# Patient Record
Sex: FEMALE | Race: BLACK OR AFRICAN AMERICAN | Employment: PART TIME | URBAN - METROPOLITAN AREA
[De-identification: names, ages, dates, MRNs, and addresses within clinical notes are randomized per-mention and may not be internally consistent; named-entity substitution may affect disease eponyms.]

---

## 2021-09-28 ENCOUNTER — TRANSCRIBE ORDER (OUTPATIENT)
Dept: SCHEDULING | Age: 61
End: 2021-09-28

## 2021-09-28 DIAGNOSIS — Z12.31 ENCOUNTER FOR SCREENING MAMMOGRAM FOR MALIGNANT NEOPLASM OF BREAST: Primary | ICD-10-CM

## 2021-09-28 DIAGNOSIS — Z80.3 FAMILY HISTORY OF BREAST CANCER IN SISTER: ICD-10-CM

## 2021-10-14 ENCOUNTER — HOSPITAL ENCOUNTER (OUTPATIENT)
Dept: MAMMOGRAPHY | Age: 61
Discharge: HOME | End: 2021-10-14
Attending: FAMILY MEDICINE
Payer: COMMERCIAL

## 2021-10-14 VITALS — WEIGHT: 199 LBS | BODY MASS INDEX: 35.26 KG/M2 | HEIGHT: 63 IN

## 2021-10-14 DIAGNOSIS — Z80.3 FAMILY HISTORY OF BREAST CANCER IN SISTER: ICD-10-CM

## 2021-10-14 DIAGNOSIS — Z12.31 ENCOUNTER FOR SCREENING MAMMOGRAM FOR MALIGNANT NEOPLASM OF BREAST: ICD-10-CM

## 2021-10-14 PROCEDURE — 77067 SCR MAMMO BI INCL CAD: CPT

## 2022-03-07 ENCOUNTER — HOSPITAL ENCOUNTER (INPATIENT)
Age: 62
LOS: 2 days | Discharge: HOME | DRG: 751 | End: 2022-03-09
Attending: EMERGENCY MEDICINE | Admitting: PSYCHIATRY & NEUROLOGY
Payer: COMMERCIAL

## 2022-03-07 DIAGNOSIS — F32.A DEPRESSION, UNSPECIFIED DEPRESSION TYPE: Primary | ICD-10-CM

## 2022-03-07 PROBLEM — F32.9 MDD (MAJOR DEPRESSIVE DISORDER): Status: ACTIVE | Noted: 2022-03-07

## 2022-03-07 LAB
ALBUMIN SERPL-MCNC: 4.1 G/DL (ref 3.5–5)
ALBUMIN/GLOB SERPL: 0.9 {RATIO} (ref 1.1–2.2)
ALP SERPL-CCNC: 80 U/L (ref 45–117)
ALT SERPL-CCNC: 38 U/L (ref 12–78)
AMPHET UR QL SCN: NEGATIVE
ANION GAP SERPL CALC-SCNC: 11 MMOL/L (ref 5–15)
APPEARANCE UR: CLEAR
AST SERPL-CCNC: 41 U/L (ref 15–37)
BACTERIA URNS QL MICRO: NEGATIVE /HPF
BARBITURATES UR QL SCN: NEGATIVE
BASOPHILS # BLD: 0 K/UL (ref 0–0.1)
BASOPHILS NFR BLD: 0 % (ref 0–1)
BENZODIAZ UR QL: NEGATIVE
BILIRUB SERPL-MCNC: 0.2 MG/DL (ref 0.2–1)
BILIRUB UR QL: NEGATIVE
BUN SERPL-MCNC: 13 MG/DL (ref 6–20)
BUN/CREAT SERPL: 16 (ref 12–20)
CALCIUM SERPL-MCNC: 9.2 MG/DL (ref 8.5–10.1)
CANNABINOIDS UR QL SCN: POSITIVE
CHLORIDE SERPL-SCNC: 101 MMOL/L (ref 97–108)
CO2 SERPL-SCNC: 27 MMOL/L (ref 21–32)
COCAINE UR QL SCN: NEGATIVE
COLOR UR: ABNORMAL
CREAT SERPL-MCNC: 0.8 MG/DL (ref 0.55–1.02)
DIFFERENTIAL METHOD BLD: ABNORMAL
DRUG SCRN COMMENT,DRGCM: ABNORMAL
EOSINOPHIL # BLD: 0.1 K/UL (ref 0–0.4)
EOSINOPHIL NFR BLD: 1 % (ref 0–7)
EPITH CASTS URNS QL MICRO: NORMAL /LPF
ERYTHROCYTE [DISTWIDTH] IN BLOOD BY AUTOMATED COUNT: 13.6 % (ref 11.5–14.5)
ETHANOL SERPL-MCNC: <10 MG/DL
FLUAV RNA SPEC QL NAA+PROBE: NOT DETECTED
FLUBV RNA SPEC QL NAA+PROBE: NOT DETECTED
GLOBULIN SER CALC-MCNC: 4.6 G/DL (ref 2–4)
GLUCOSE SERPL-MCNC: 93 MG/DL (ref 65–100)
GLUCOSE UR STRIP.AUTO-MCNC: NEGATIVE MG/DL
HCT VFR BLD AUTO: 36 % (ref 35–47)
HGB BLD-MCNC: 11.9 G/DL (ref 11.5–16)
HGB UR QL STRIP: ABNORMAL
IMM GRANULOCYTES # BLD AUTO: 0 K/UL (ref 0–0.04)
IMM GRANULOCYTES NFR BLD AUTO: 0 % (ref 0–0.5)
KETONES UR QL STRIP.AUTO: 15 MG/DL
LEUKOCYTE ESTERASE UR QL STRIP.AUTO: NEGATIVE
LIPASE SERPL-CCNC: 76 U/L (ref 73–393)
LYMPHOCYTES # BLD: 3.9 K/UL (ref 0.8–3.5)
LYMPHOCYTES NFR BLD: 36 % (ref 12–49)
MCH RBC QN AUTO: 31.6 PG (ref 26–34)
MCHC RBC AUTO-ENTMCNC: 33.1 G/DL (ref 30–36.5)
MCV RBC AUTO: 95.5 FL (ref 80–99)
METHADONE UR QL: NEGATIVE
MONOCYTES # BLD: 0.8 K/UL (ref 0–1)
MONOCYTES NFR BLD: 7 % (ref 5–13)
NEUTS SEG # BLD: 6.1 K/UL (ref 1.8–8)
NEUTS SEG NFR BLD: 56 % (ref 32–75)
NITRITE UR QL STRIP.AUTO: NEGATIVE
NRBC # BLD: 0 K/UL (ref 0–0.01)
NRBC BLD-RTO: 0 PER 100 WBC
OPIATES UR QL: NEGATIVE
PCP UR QL: NEGATIVE
PH UR STRIP: 6 [PH] (ref 5–8)
PLATELET # BLD AUTO: 284 K/UL (ref 150–400)
PLATELET COMMENTS,PCOM: ABNORMAL
PMV BLD AUTO: 9.8 FL (ref 8.9–12.9)
POTASSIUM SERPL-SCNC: 3.5 MMOL/L (ref 3.5–5.1)
PROT SERPL-MCNC: 8.7 G/DL (ref 6.4–8.2)
PROT UR STRIP-MCNC: NEGATIVE MG/DL
RBC # BLD AUTO: 3.77 M/UL (ref 3.8–5.2)
RBC #/AREA URNS HPF: NORMAL /HPF (ref 0–5)
RBC MORPH BLD: ABNORMAL
SARS-COV-2, COV2: NOT DETECTED
SODIUM SERPL-SCNC: 139 MMOL/L (ref 136–145)
SP GR UR REFRACTOMETRY: 1.02 (ref 1–1.03)
TSH SERPL DL<=0.05 MIU/L-ACNC: 1.85 UIU/ML (ref 0.36–3.74)
UROBILINOGEN UR QL STRIP.AUTO: 0.2 EU/DL (ref 0.2–1)
WBC # BLD AUTO: 10.9 K/UL (ref 3.6–11)
WBC URNS QL MICRO: NORMAL /HPF (ref 0–4)

## 2022-03-07 PROCEDURE — 83690 ASSAY OF LIPASE: CPT

## 2022-03-07 PROCEDURE — 80053 COMPREHEN METABOLIC PANEL: CPT

## 2022-03-07 PROCEDURE — 82077 ASSAY SPEC XCP UR&BREATH IA: CPT

## 2022-03-07 PROCEDURE — 80307 DRUG TEST PRSMV CHEM ANLYZR: CPT

## 2022-03-07 PROCEDURE — 84439 ASSAY OF FREE THYROXINE: CPT

## 2022-03-07 PROCEDURE — 36415 COLL VENOUS BLD VENIPUNCTURE: CPT

## 2022-03-07 PROCEDURE — 87636 SARSCOV2 & INF A&B AMP PRB: CPT

## 2022-03-07 PROCEDURE — 74011250637 HC RX REV CODE- 250/637: Performed by: PSYCHIATRY & NEUROLOGY

## 2022-03-07 PROCEDURE — 81001 URINALYSIS AUTO W/SCOPE: CPT

## 2022-03-07 PROCEDURE — 65220000003 HC RM SEMIPRIVATE PSYCH

## 2022-03-07 PROCEDURE — 85025 COMPLETE CBC W/AUTO DIFF WBC: CPT

## 2022-03-07 PROCEDURE — 74011250636 HC RX REV CODE- 250/636: Performed by: EMERGENCY MEDICINE

## 2022-03-07 PROCEDURE — 99285 EMERGENCY DEPT VISIT HI MDM: CPT

## 2022-03-07 PROCEDURE — 84443 ASSAY THYROID STIM HORMONE: CPT

## 2022-03-07 RX ORDER — HALOPERIDOL 5 MG/ML
5 INJECTION INTRAMUSCULAR
Status: DISCONTINUED | OUTPATIENT
Start: 2022-03-07 | End: 2022-03-08

## 2022-03-07 RX ORDER — LORAZEPAM 2 MG/ML
1 INJECTION INTRAMUSCULAR
Status: DISCONTINUED | OUTPATIENT
Start: 2022-03-07 | End: 2022-03-08

## 2022-03-07 RX ORDER — ASPIRIN 81 MG/1
81 TABLET ORAL DAILY
Status: DISCONTINUED | OUTPATIENT
Start: 2022-03-08 | End: 2022-03-09 | Stop reason: HOSPADM

## 2022-03-07 RX ORDER — ATORVASTATIN CALCIUM 40 MG/1
80 TABLET, FILM COATED ORAL
Status: DISCONTINUED | OUTPATIENT
Start: 2022-03-07 | End: 2022-03-09 | Stop reason: HOSPADM

## 2022-03-07 RX ORDER — SODIUM CHLORIDE 0.9 % (FLUSH) 0.9 %
5-10 SYRINGE (ML) INJECTION ONCE
Status: DISCONTINUED | OUTPATIENT
Start: 2022-03-07 | End: 2022-03-07

## 2022-03-07 RX ORDER — ASPIRIN 81 MG/1
TABLET ORAL
COMMUNITY

## 2022-03-07 RX ORDER — ROSUVASTATIN CALCIUM 20 MG/1
TABLET, COATED ORAL
COMMUNITY
Start: 2021-12-20

## 2022-03-07 RX ORDER — ADHESIVE BANDAGE
30 BANDAGE TOPICAL DAILY PRN
Status: DISCONTINUED | OUTPATIENT
Start: 2022-03-07 | End: 2022-03-09 | Stop reason: HOSPADM

## 2022-03-07 RX ORDER — OLANZAPINE 2.5 MG/1
5 TABLET ORAL
Status: DISCONTINUED | OUTPATIENT
Start: 2022-03-07 | End: 2022-03-08

## 2022-03-07 RX ORDER — TRAZODONE HYDROCHLORIDE 50 MG/1
50 TABLET ORAL
Status: DISCONTINUED | OUTPATIENT
Start: 2022-03-07 | End: 2022-03-09 | Stop reason: HOSPADM

## 2022-03-07 RX ORDER — ACETAMINOPHEN 325 MG/1
650 TABLET ORAL
Status: DISCONTINUED | OUTPATIENT
Start: 2022-03-07 | End: 2022-03-09 | Stop reason: HOSPADM

## 2022-03-07 RX ORDER — CHLORPHENIRAMINE MALEATE, PHENYLEPHRINE HCL 4; 10 MG/1; MG/1
TABLET, FILM COATED ORAL
COMMUNITY
End: 2022-03-09

## 2022-03-07 RX ORDER — HYDROXYZINE 25 MG/1
50 TABLET, FILM COATED ORAL
Status: DISCONTINUED | OUTPATIENT
Start: 2022-03-07 | End: 2022-03-09 | Stop reason: HOSPADM

## 2022-03-07 RX ORDER — BENZTROPINE MESYLATE 1 MG/1
1 TABLET ORAL
Status: DISCONTINUED | OUTPATIENT
Start: 2022-03-07 | End: 2022-03-08

## 2022-03-07 RX ORDER — DIPHENHYDRAMINE HYDROCHLORIDE 50 MG/ML
50 INJECTION, SOLUTION INTRAMUSCULAR; INTRAVENOUS
Status: DISCONTINUED | OUTPATIENT
Start: 2022-03-07 | End: 2022-03-08

## 2022-03-07 RX ADMIN — SODIUM CHLORIDE 1000 ML: 9 INJECTION, SOLUTION INTRAVENOUS at 17:14

## 2022-03-07 RX ADMIN — ATORVASTATIN CALCIUM 80 MG: 40 TABLET, FILM COATED ORAL at 21:19

## 2022-03-07 NOTE — ED TRIAGE NOTES
Pt arrived by POV with complaint of not feeling like herself. Pt reports yesterday she had thoughts of hurting herself without a plan. Pt asking if hot flashes and going through the change can make her feel like this, pt educated that hormonal imbalance can make women feel sad and like they are not in control.   Pt denies SI today, pt educated on ER flow
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
## 2022-03-07 NOTE — ED PROVIDER NOTES
EMERGENCY DEPARTMENT HISTORY AND PHYSICAL EXAM          Date: 3/7/2022  Patient Name: Hollie Napoles    History of Presenting Illness     Chief Complaint   Patient presents with   [address] Problem       History Provided By: Patient    HPI: Hollie Napoles is a 63 y.o. female, pmhx high cholesterol, who presents ambulatory from her primary care office to the ED c/o depression    Patient explains she has been doing okay and she even went to a party Saturday night but yesterday she just felt hopeless all day and last night she wanted to commit suicide. She states she does not have any plans and she does not feel like she wants to hurt her self today but she still feels hopeless. She states she has been eating and drinking and sleeping okay and is not had any headaches, vision changes, chest pain, abdominal pain or back pain. She notes that some mornings when she gets up she does feel palpitations and some days she feels dizzy when her sinuses are acting up, but she denies both at this time. At her primary care doctor's office she screened extremely high on the depression scale so they called the ER and transported the patient via local 's department. PCP: Ede Chinchilla MD    Allergies: Penicillin  Social Hx: -tobacco, -vaping, -EtOH, -Illicit Drugs; There are no other complaints, changes, or physical findings at this time. Current Facility-Administered Medications   Medication Dose Route Frequency Provider Last Rate Last Admin    sodium chloride (NS) flush 5-10 mL  5-10 mL IntraVENous ONCE TermeerCurt MD        sodium chloride 0.9 % bolus infusion 1,000 mL  1,000 mL IntraVENous ONCE TermeerCurt MD         Current Outpatient Medications   Medication Sig Dispense Refill    rosuvastatin (CRESTOR) 20 mg tablet rosuvastatin 20 mg tablet   Take 1 tablet every day by oral route.       aspirin delayed-release 81 mg tablet Adult Aspirin Regimen 81 mg tablet,delayed release   Take 1 tablet every day by oral route.  chlorpheniramine-phenylephrine (Sinus and Allergy PE) 4-10 mg tab Sinus and Allergy PE         Past History     Past Medical History:  Past Medical History:   Diagnosis Date    Menopause        Past Surgical History:  Past Surgical History:   Procedure Laterality Date    HX BREAST BIOPSY Left     BENIGN EARLY 2000s       Family History:  Family History   Problem Relation Age of Onset    Breast Cancer Sister        Social History:  Social History     Tobacco Use    Smoking status: Never Smoker    Smokeless tobacco: Never Used   Vaping Use    Vaping Use: Never used   Substance Use Topics    Alcohol use: Yes     Comment: every other day    Drug use: Not on file       Allergies: Allergies   Allergen Reactions    Pcn [Penicillins] Rash         Review of Systems   Review of Systems   Constitutional: Negative for activity change, appetite change, chills, fatigue, fever and unexpected weight change. HENT: Negative for congestion. Eyes: Negative for pain and visual disturbance. Respiratory: Negative for cough and shortness of breath. Cardiovascular: Positive for palpitations. Negative for chest pain. Gastrointestinal: Negative for abdominal pain, diarrhea, nausea and vomiting. Genitourinary: Negative for dysuria. Musculoskeletal: Negative for back pain. Skin: Negative for rash. Neurological: Positive for dizziness (At times but none recently). Negative for headaches. Psychiatric/Behavioral: Positive for dysphoric mood. Negative for agitation and behavioral problems. Physical Exam   Physical Exam  Vitals and nursing note reviewed. Constitutional:       Appearance: She is well-developed. She is not diaphoretic. Comments: This is an overweight middle-aged female with good eye contact, with normal vital signs, in mild acute distress   HENT:      Head: Normocephalic and atraumatic.    Eyes:      General:         Right eye: No discharge. Left eye: No discharge. Conjunctiva/sclera: Conjunctivae normal.      Pupils: Pupils are equal, round, and reactive to light. Cardiovascular:      Rate and Rhythm: Normal rate and regular rhythm. Heart sounds: Normal heart sounds. No murmur heard. Pulmonary:      Effort: Pulmonary effort is normal. No respiratory distress. Breath sounds: Normal breath sounds. No stridor. No wheezing, rhonchi or rales. Abdominal:      General: Bowel sounds are normal. There is no distension. Palpations: Abdomen is soft. There is no mass. Tenderness: There is no abdominal tenderness. There is no guarding or rebound. Hernia: No hernia is present. Musculoskeletal:         General: Normal range of motion. Cervical back: Normal range of motion and neck supple. Right lower leg: No edema. Left lower leg: No edema. Skin:     General: Skin is warm and dry. Coloration: Skin is not pale. Findings: No erythema or rash. Neurological:      Mental Status: She is alert and oriented to person, place, and time. Cranial Nerves: No cranial nerve deficit. Motor: No abnormal muscle tone. Psychiatric:         Attention and Perception: Attention normal.         Mood and Affect: Affect is blunt and flat. Speech: She is communicative. Speech is not rapid and pressured, delayed, slurred or tangential.         Behavior: Behavior is not agitated, slowed, aggressive or withdrawn. Thought Content:  Thought content is not paranoid or delusional.       Diagnostic Study Results     Labs -     Recent Results (from the past 12 hour(s))   CBC WITH AUTOMATED DIFF    Collection Time: 03/07/22  5:00 PM   Result Value Ref Range    WBC 10.9 3.6 - 11.0 K/uL    RBC 3.77 (L) 3.80 - 5.20 M/uL    HGB 11.9 11.5 - 16.0 g/dL    HCT 36.0 35.0 - 47.0 %    MCV 95.5 80.0 - 99.0 FL    MCH 31.6 26.0 - 34.0 PG    MCHC 33.1 30.0 - 36.5 g/dL    RDW 13.6 11.5 - 14.5 % PLATELET 544 970 - 602 K/uL    MPV 9.8 8.9 - 12.9 FL    NRBC 0.0 0  WBC    ABSOLUTE NRBC 0.00 0.00 - 0.01 K/uL    NEUTROPHILS 56 32 - 75 %    LYMPHOCYTES 36 12 - 49 %    MONOCYTES 7 5 - 13 %    EOSINOPHILS 1 0 - 7 %    BASOPHILS 0 0 - 1 %    IMMATURE GRANULOCYTES 0 0.0 - 0.5 %    ABS. NEUTROPHILS 6.1 1.8 - 8.0 K/UL    ABS. LYMPHOCYTES 3.9 (H) 0.8 - 3.5 K/UL    ABS. MONOCYTES 0.8 0.0 - 1.0 K/UL    ABS. EOSINOPHILS 0.1 0.0 - 0.4 K/UL    ABS. BASOPHILS 0.0 0.0 - 0.1 K/UL    ABS. IMM. GRANS. 0.0 0.00 - 0.04 K/UL    DF SMEAR SCANNED      PLATELET COMMENTS ADEQUATE PLATELETS      RBC COMMENTS ANISOCYTOSIS  1+       METABOLIC PANEL, COMPREHENSIVE    Collection Time: 03/07/22  5:00 PM   Result Value Ref Range    Sodium 139 136 - 145 mmol/L    Potassium 3.5 3.5 - 5.1 mmol/L    Chloride 101 97 - 108 mmol/L    CO2 27 21 - 32 mmol/L    Anion gap 11 5 - 15 mmol/L    Glucose 93 65 - 100 mg/dL    BUN 13 6 - 20 MG/DL    Creatinine 0.80 0.55 - 1.02 MG/DL    BUN/Creatinine ratio 16 12 - 20      GFR est AA >60 >60 ml/min/1.73m2    GFR est non-AA >60 >60 ml/min/1.73m2    Calcium 9.2 8.5 - 10.1 MG/DL    Bilirubin, total 0.2 0.2 - 1.0 MG/DL    ALT (SGPT) 38 12 - 78 U/L    AST (SGOT) 41 (H) 15 - 37 U/L    Alk.  phosphatase 80 45 - 117 U/L    Protein, total 8.7 (H) 6.4 - 8.2 g/dL    Albumin 4.1 3.5 - 5.0 g/dL    Globulin 4.6 (H) 2.0 - 4.0 g/dL    A-G Ratio 0.9 (L) 1.1 - 2.2     LIPASE    Collection Time: 03/07/22  5:00 PM   Result Value Ref Range    Lipase 76 73 - 393 U/L   ETHYL ALCOHOL    Collection Time: 03/07/22  5:00 PM   Result Value Ref Range    ALCOHOL(ETHYL),SERUM <10 <10 MG/DL   TSH 3RD GENERATION    Collection Time: 03/07/22  5:00 PM   Result Value Ref Range    TSH 1.85 0.36 - 3.74 uIU/mL   COVID-19 WITH INFLUENZA A/B    Collection Time: 03/07/22  6:30 PM   Result Value Ref Range    SARS-CoV-2 Not detected NOTD      Influenza A by PCR Not detected NOTD      Influenza B by PCR Not detected NOTD     URINALYSIS W/ RFLX MICROSCOPIC    Collection Time: 03/07/22  6:45 PM   Result Value Ref Range    Color YELLOW/STRAW      Appearance CLEAR CLEAR      Specific gravity 1.025 1.003 - 1.030      pH (UA) 6.0 5.0 - 8.0      Protein Negative NEG mg/dL    Glucose Negative NEG mg/dL    Ketone 15 (A) NEG mg/dL    Bilirubin Negative NEG      Blood LARGE (A) NEG      Urobilinogen 0.2 0.2 - 1.0 EU/dL    Nitrites Negative NEG      Leukocyte Esterase Negative NEG     DRUG SCREEN, URINE    Collection Time: 03/07/22  6:45 PM   Result Value Ref Range    AMPHETAMINES Negative NEG      BARBITURATES Negative NEG      BENZODIAZEPINES Negative NEG      COCAINE Negative NEG      METHADONE Negative NEG      OPIATES Negative NEG      PCP(PHENCYCLIDINE) Negative NEG      THC (TH-CANNABINOL) Positive (A) NEG      Drug screen comment (NOTE)    URINE MICROSCOPIC ONLY    Collection Time: 03/07/22  6:45 PM   Result Value Ref Range    WBC 0-4 0 - 4 /hpf    RBC 20-50 0 - 5 /hpf    Epithelial cells FEW FEW /lpf    Bacteria Negative NEG /hpf       Radiologic Studies -   No orders to display     CT Results  (Last 48 hours)    None        CXR Results  (Last 48 hours)    None            Medical Decision Making   I am the first provider for this patient. I reviewed the vital signs, available nursing notes, past medical history, past surgical history, family history and social history. Vital Signs-Reviewed the patient's vital signs. Patient Vitals for the past 12 hrs:   Temp Pulse Resp BP SpO2   03/07/22 1655 98.2 °F (36.8 °C) 92 18 132/82 98 %       Pulse Oximetry Analysis - 98% on RA      Records Reviewed: Nursing Notes, Old Medical Records, Previous Radiology Studies and Previous Laboratory Studies    Provider Notes (Medical Decision Making):   MDM: Middle-aged female with a history of suicidal attempt many years ago, presents with sudden onset feeling of hopelessness.   She states she does not feel that she has been depressed the past few weeks that she has been happy-go-salvatore and has not had any issues. She agrees that what is happening today is not normal and that she needs help. At this time she does not have any thoughts of self-harm or harming others. Medical clearance to be provided and a be smart consult will be obtained while patient is in the emergency room    ED Course:   Initial assessment performed. The patients presenting problems have been discussed, and they are in agreement with the care plan formulated and outlined with them. I have encouraged them to ask questions as they arise throughout their visit. PROGRESS NOTE:      ED Course as of 03/07/22 1958   Mon Mar 07, 2022   3865 Patient is medically cleared for psychiatric evaluation. Discussed case with Katie from [employer] who will evaluate the patient. [JT]   1919 Patient signed out to Dr. Danna Corbin, pending psychiatric recommendations. [JT]   1956 Behavioral health recommend admission, patient will be admitted for further management. [MF]      ED Course User Index  [JT] Omero Vogt MD  [MF] Elena Thomas MD        7:57 PM    Patient presents for depression seen by Dr. Mateo Montes, evaluated behavioral health, patient was medically cleared and admission was recommended. Patient be admitted for further management. I have reviewed all pertinent and currently available diagnostic test results for this visit including, but not limited to, labs, xrays, and EKGs. I have reviewed all pertinent and currently available medical records. My plan of care and further evaluation and/or disposition is based on these results, as well as the initial, and subsequent, history and physical exam, as well as any additional complaints during the visit. Radha Bentley MD        Critical Care Time:   0      Diagnosis     Clinical Impression:   1. Depression, unspecified depression type        PLAN:  1.    Admit per [facility]    Disposition:  Admitted        Please note, this dictation was completed with [employer] computer voice recognition software. Quite often unanticipated grammatical, syntax, homophones, and other interpretive errors are inadvertently transcribed by the computer software. Please disregard these errors. Please excuse any errors that have escaped final proof reading.

## 2022-03-07 NOTE — ED NOTES
Pt educated that a urine sample is still needed and to let staff know when she can go. This writer called Katie at [employer] back with pts sister Javier Aaron telephone number [phone] after getting verbal permission from this patient. Plan: voluntary admission.

## 2022-03-08 PROBLEM — F32.2 SEVERE MAJOR DEPRESSION WITHOUT PSYCHOTIC FEATURES (HCC): Status: ACTIVE | Noted: 2022-03-07

## 2022-03-08 PROBLEM — F33.1 MAJOR DEPRESSIVE DISORDER, RECURRENT EPISODE, MODERATE (HCC): Status: ACTIVE | Noted: 2022-03-07

## 2022-03-08 LAB — T4 FREE SERPL-MCNC: 0.7 NG/DL (ref 0.8–1.5)

## 2022-03-08 PROCEDURE — 65220000003 HC RM SEMIPRIVATE PSYCH

## 2022-03-08 PROCEDURE — 74011250637 HC RX REV CODE- 250/637: Performed by: PSYCHIATRY & NEUROLOGY

## 2022-03-08 PROCEDURE — 90792 PSYCH DIAG EVAL W/MED SRVCS: CPT | Performed by: PSYCHIATRY & NEUROLOGY

## 2022-03-08 RX ORDER — CITALOPRAM 20 MG/1
10 TABLET, FILM COATED ORAL
Status: COMPLETED | OUTPATIENT
Start: 2022-03-08 | End: 2022-03-08

## 2022-03-08 RX ORDER — CITALOPRAM 20 MG/1
20 TABLET, FILM COATED ORAL DAILY
Status: DISCONTINUED | OUTPATIENT
Start: 2022-03-09 | End: 2022-03-09 | Stop reason: HOSPADM

## 2022-03-08 RX ORDER — CETIRIZINE HCL 10 MG
10 TABLET ORAL
Status: DISCONTINUED | OUTPATIENT
Start: 2022-03-08 | End: 2022-03-09 | Stop reason: HOSPADM

## 2022-03-08 RX ADMIN — ASPIRIN 81 MG: 81 TABLET, COATED ORAL at 09:07

## 2022-03-08 RX ADMIN — CITALOPRAM HYDROBROMIDE 10 MG: 20 TABLET ORAL at 09:07

## 2022-03-08 RX ADMIN — CETIRIZINE HYDROCHLORIDE 10 MG: 10 TABLET, FILM COATED ORAL at 11:26

## 2022-03-08 RX ADMIN — TRAZODONE HYDROCHLORIDE 50 MG: 50 TABLET ORAL at 21:04

## 2022-03-08 RX ADMIN — ATORVASTATIN CALCIUM 80 MG: 40 TABLET, FILM COATED ORAL at 21:02

## 2022-03-08 NOTE — PROGRESS NOTES
Problem: Falls - Risk of  Goal: *Absence of Falls  Description: Document Freddie Oris Fall Risk and appropriate interventions in the flowsheet.   Outcome: Progressing Towards Goal  Note: Fall Risk Interventions:            Medication Interventions: Assess postural VS orthostatic hypotension,Evaluate medications/consider consulting pharmacy,Teach patient to arise slowly

## 2022-03-08 NOTE — BH NOTES
Affect blunted, mood depressed. Patient is alert & oriented x 4. Patient is quiet but interacts with prompting. Patient denies SI/HI/AVH/pain. No delusional statements made. Patient is compliant with medications. Patient appetite good eats 100% of all vital signs within normal limits. Patient attended and engaged in group appropriately. Patient in no apparent distress all vital signs within normal limits. PRN Medication Documentation    Specific patient behavior that led to need for PRN medication: Patient c/o having allergies. Staff interventions attempted prior to PRN being given: Assessment done. PRN medication given: Zyrtec 10 mg po given at 1126. Patient response/effectiveness of PRN medication: Positive results. No further complaints voiced.

## 2022-03-08 NOTE — GROUP NOTE
[facility]  GROUP DOCUMENTATION INDIVIDUAL                                                                          Group Therapy Note    Date: 3/8/2022    Group Start Time: 1400  Group End Time: 1450  Group Topic: Process Group - Inpatient    [address], [address] GROUP DOCUMENTATION GROUP    Group Therapy Note    Attendees: Focus of session was based on the handout Self-Compassion in Daily Life.   We spoke about the basis of this comes from the [facility] program (mindful self-compassion.)  We spoke about the two components are to be mindful  and aware when you are under stress and suffering and the other component is to respond to yourself with care and kindness, to be compassionate. We spoke about different aspects of life which include physical, mental, emotional, relational, and spiritual.  We spoke about how we can focus on self-compassion through all of these aspects of life and how we already take care of ourselves and new ways we can take care of ourselves. Attendance: Attended    Patient's Goal:       Verbalize depressive feelings and share possible causes               Interventions/techniques: Informed, Validated, Promoted peer support and Supported    Follows Directions: Followed directions    Interactions: Interacted appropriately    Mental Status: Congruent and Preoccupied    Behavior/appearance: Attentive, Cooperative, Neatly groomed and Needed prompting    Goals Achieved: Able to engage in interactions, Discussed coping, Identified feelings, Identified triggers and Increased hopefulness      Additional Notes:  Rosie Ward participated in group well and shared what she has been experiencing in the past several weeks. She spoke about how she had been in a long term relationship with her GF and family and that she has never been able to know how to take care of herself. She has not been on medication before and she is motivated to learn how to take care of herself.       Srinivas Buckner TOBIAS Bustos

## 2022-03-08 NOTE — ED NOTES
TRANSFER - OUT REPORT:    Verbal report given to [address] on [facility]  being transferred to [address] [address] for routine progression of care       Report consisted of patients Situation, Background, Assessment and   Recommendations(SBAR). Information from the following report(s) SBAR, ED Summary, Procedure Summary, Intake/Output, MAR, Recent Results and Med Rec Status was reviewed with the receiving nurse. Lines:       Opportunity for questions and clarification was provided. Patient transported with:  Security transported in [facility] with belongings.

## 2022-03-08 NOTE — ROUTINE PROCESS
TRANSFER - IN REPORT:    Verbal report received from Aracelis Pickett RN(name) on Severiano Hart  being received from [facility] ED(unit) for routine progression of care      Report consisted of patients Situation, Background, Assessment and   Recommendations(SBAR). Information from the following report(s) SBAR, Kardex, ED Summary, [facility] and Recent Results was reviewed with the receiving nurse. Opportunity for questions and clarification was provided. Assessment completed upon patients arrival to unit and care assumed.

## 2022-03-08 NOTE — H&P
[facility]  Hospitalist Progress Note    NAME: Eliceo Singleton   :  1960   MRN:  [id number]     Total duration of encounter: 1 day           Subjective:     Chief Complaint / Reason for Physician Visit  \"I was thoughts which were not good\". \"Telling me to harm myself\". Patient is a pleasant 63year old female who presented with the above complaint. She tells me this is her first visit here. Lives at home with her twin sister, Roseline Leventhal. Denies any smoking history. No illicit drug use. \"Occasional beer\". Past medical history of major depressive disorder, menopause, hyperlipidemia. No recent falls. Sleeps well. No GI or  issues. Vision and hearing okay. Healthy appetite.      Review of Systems:  Symptom Y/N Comments  Symptom Y/N Comments   Fever/Chills N   Chest Pain N    Poor Appetite N   Edema N    Cough N   Abdominal Pain N    Sputum N   Joint Pain N    SOB/BRADSHAW N   Pruritis/Rash N    Nausea/vomit N   Tolerating PT/OT     Diarrhea N   Tolerating Diet N    Constipation N   Other         Current Facility-Administered Medications:     [START ON 3/9/2022] citalopram (CELEXA) tablet 20 mg, 20 mg, Oral, DAILY, Jhno Espinoza MD    aspirin delayed-release tablet 81 mg, 81 mg, Oral, DAILY, Madison Brandon MD, 81 mg at 22 0907    atorvastatin (LIPITOR) tablet 80 mg, 80 mg, Oral, QHS, Madison Brandon MD, 80 mg at 22 2119    hydrOXYzine HCL (ATARAX) tablet 50 mg, 50 mg, Oral, TID PRN, Madison Brandon MD    traZODone (DESYREL) tablet 50 mg, 50 mg, Oral, QHS PRN, Madison Brandon MD    acetaminophen (TYLENOL) tablet 650 mg, 650 mg, Oral, Q4H PRN, Madison Brandon MD    magnesium hydroxide (MILK OF MAGNESIA) 400 mg/5 mL oral suspension 30 mL, 30 mL, Oral, DAILY PRN, Madison Brandon MD    Objective:     VITALS:   Patient Vitals for the past 12 hrs:   Temp Pulse Resp BP SpO2   22 0732 98.4 °F (36.9 °C) 84 18 134/73 95 % Intake/Output Summary (Last 24 hours) at 3/8/2022 1105  Last data filed at 3/7/2022 1805  Gross per 24 hour   Intake 1000 ml   Output    Net 1000 ml        PHYSICAL EXAM:  General: WD, WN. Alert, cooperative, no acute distress    EENT:  EOMI. Anicteric sclerae. MMM  Resp:  CTA bilaterally, no wheezing or rales. No accessory muscle use  CV:  Regular  rhythm,  No edema  GI:  Soft, Non distended, Non tender. +Bowel sounds  Neurologic:  Alert and oriented X 3, normal speech,   Psych:   Good insight. Not anxious nor agitated  Skin:  No rashes. No jaundice    LABS:  I reviewed today's most current labs and imaging studies. Pertinent labs include:  Recent Labs     03/07/22  1700   WBC 10.9   HGB 11.9   HCT 36.0        Recent Labs     03/07/22  1700      K 3.5      CO2 27   GLU 93   BUN 13   CREA 0.80   CA 9.2   ALB 4.1   TBILI 0.2   ALT 38       RADIOLOGY:  [unfilled]      Procedures: see electronic medical records for all procedures/Xrays and details which were not copied into this note but were reviewed prior to creation of Plan.         Assessment / Plan:     Principal Problem:  - Major depressive disorder, recurrent episode, moderate ([address] (3/7/2022)   Per psychiatry.     -Hyperlipidemia  Continue on Lipitor            ______________________________________________________________________  SAFETY:   Code Status:Full  DVT prophylaxis:No VTE Prophylaxis Needed  Stress Ulcer prophylaxis: Not Indicated  Bladder catheter:no  Family Contact Info:  Primary Emergency Contact: Tenzin Canchola, Home Phone: [phone]  Bedded: [facility] Room 234/01  Disposition: TBD, likely home when stable  Admission status:  Inpatient    Reviewed most current lab test results and cultures  YES  Reviewed most current radiology test results   YES  Review and summation of old records today    NO  Reviewed patient's current orders and MAR    YES  PMH/SH reviewed - no change compared to H&P    Care Plan discussed with: Comments  Patient x     Family  x    RN x     Care Manager       Consultant                           Multidiciplinary team rounds were held today with , nursing, pharmacist and clinical coordinator. Patient's plan of care was discussed; medications were reviewed and discharge planning was addressed.         ____________________________________________    Total NON Critical Care TIME:  30  Minutes        Comments   >50% of visit spent in counseling and coordination of care       Signed: Tami Akers MD  [facility] Hospitalist  [phone]

## 2022-03-08 NOTE — BH NOTES
Pt was cooperative with admission process. Affect was blunted and mood anxious/depressed. Pt denies any current SI/HI/AVH/Pain. Pt vital signs stable. Pt refused snack for this evening. Pt did state that she drinks alcohol daily (1-2 drinks) but does not feel like alcohol is an issue for her. Pt also takes \"weed gummies\" at night to help her sleep. 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 Pt has never been hospitalized for psych issues in the past and is nervous about being in the hospital. Pt also stated that she has had issues with staying asleep at night. Pt was compliant with medications and did not request a PRN. Pt is in no apparent distress at this time and made no delusional statements. Pt rested in her bed with her eyes closed for 8 hours.

## 2022-03-08 NOTE — BH NOTES
Patient was admitted to the unit voluntarily after having sudden passive SI, no plan or intent but she knew this was not normal for her. She does not have any past history of hospitalization. She is employed able to care for her self and feels she has an adequate support system. She was started on medication and will likely return home tomorrow following up with her PCP and therapy if she is interested. She has been appropriate with staff and peers and participating in activities.

## 2022-03-08 NOTE — PROGRESS NOTES
Behavioral Services  Medicare Certification Upon Admission    I certify that this patient's inpatient psychiatric hospital admission is medically necessary for:    [x] (1) Treatment which could reasonably be expected to improve this patient's condition,       [x] (2) Or for diagnostic study;     AND     [x](2) The inpatient psychiatric services are provided while the individual is under the care of a physician and are included in the individualized plan of care.     Estimated length of stay/service 5-7 days    Plan for post-hospital care home    Electronically signed by Mumtaz Cardoza MD on 3/7/2022 at 8:51 PM

## 2022-03-08 NOTE — PROGRESS NOTES
03/07/22 2138   Group Therapy   Group Community meeting   Attendance Did not attend   Number of participants 3   Time in 2000   Time out 2025   Total Time 25   MTP problem Depression   Interventions/techniques Informed

## 2022-03-08 NOTE — ROUTINE PROCESS
Shift change report given to Adelina Molina Rn, re: SBAR. Medications, and behavior.   Peter Fall Risk Score (1)

## 2022-03-08 NOTE — BH NOTES
PSYCHOSOCIAL ASSESSMENT  :Patient identifying info:   Eliceo Singleton is a 63 y.o., female admitted 3/7/2022  4:48 PM     Presenting problem and precipitating factors: patient was having suicidal ideations no plan or intent    Mental status assessment: pleasant and cooperative,, endorses recent depressive symptoms, denies SI/HI/AVH, no elder, thoughts are organized and coherent, Insight and judgment are good    Strengths/Recreation/Coping Skills: employed, has support, motivated for treatment, able to care for self    Collateral information: patient    Current psychiatric /substance abuse providers and contact info: none    Previous psychiatric/substance abuse providers and response to treatment: none    Family history of mental illness or substance abuse: sister and niece have anxiety    Substance abuse history:  denies  Social History     Tobacco Use    Smoking status: Never Smoker    Smokeless tobacco: Never Used   Substance Use Topics    Alcohol use: Yes     Comment: every other day       History of biomedical complications associated with substance abuse: n/a    Patient's current acceptance of treatment or motivation for change:n/a    Family constellation: single never  no children, has a sister    Is significant other involved? n/a    Describe support system: sister    Describe living arrangements and home environment: lives with her sister in home    GUARDIAN/POA: NO    Guardian Name:     Guardian Contact:     Health issues:   Hospital Problems  Date Reviewed: 3/8/2022          Codes Class Noted POA    * (Principal) Major depressive disorder, recurrent episode, moderate ([facility] ICD-10-CM: F33.1  ICD-9-CM: 296.32  3/7/2022 Unknown              Trauma history: denies    Legal issues: denies    History of  service: denies    Financial status: employed    Restorationism/cultural factors: none expressed    Education/work history: HS graduate, CNA    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

## 2022-03-08 NOTE — BH NOTES
[facility] [place] [facility] re: SBAR, medications, and behavior.   Peter fall risk score: 1

## 2022-03-08 NOTE — MASTER TREATMENT PLAN
[facility]  Master Treatment Plan for Adal Yusuf    Date Treatment Plan Initiated: 03/07/2022    Treatment Plan Modalities:  Type of Modality Amount  (x minutes) Frequency (x/week) Duration (x days) Name of Responsible Staff   [address] [place] [address] meetings to encourage peer interactions 30 14 1 Elana Esqueda., [facility]   Group psychotherapy to assist in building coping skills and internal controls 60 5 1 Lynn [facility]oe., [facility]  Jennifer Seats., [facility]   Therapeutic activity groups to build coping skills 61 7 1 Morena Bell., T     Psychoeducation in group setting to address:   Medication education  Coping Skills  Symptom Management   60 7 1 Lynn Choe., [facility]  Jennifer Seats., [facility]  Nilton Hurtado., RN  Flower Baer RN   Discharge planning   60 2 1 Anali Washington.,    Spirituality    60 2 1 David Villela.   Physician medication management   15 7 1 IRINEO Umana MD                                         Treatment Team Signatures    I have participated in the development of this plan of treatment and agree to its implementation.     Patient Signature       Patient Printed Name Date/Time   Social Work/Therapist Signature       Social Work/Therapist Printed Name Date/Time   RN Signature       RN Printed Name  Kira Romero, [facility] Date/Time  03/07/2022  @0807   Other Signature     Other Signature Date/Time   MD Signature       MD Printed Name Date/Time

## 2022-03-08 NOTE — ED NOTES
Katie with [facility] aware all labs back and states they should be calling back with a bed in a few minutes.

## 2022-03-08 NOTE — H&P
[facility] HISTORY AND PHYSICAL    Name:  Maxim Núñez  MR#:  [id number]  :  1960  ACCOUNT #:  [de-identified]  ADMIT DATE:  2022      [facility]    HISTORY OF PRESENT ILLNESS:  The patient is a 72-year-old single female, with little to no past psychiatric treatment, who was admitted voluntarily from the [facility] emergency room due to worsening depression including some recent suicidal thoughts. She states that she has been dealing with worsening depression for the past year or so, but she describes a longstanding history of some mood fluctuation including brief depressive episode, such as those that she is having now. Specifically, she states that her mood is more depressed than anything, but she also has days or moments when her mood can be fairly elevated, although not to the point of even being considered fully hypomanic. She states that recently she was actually feeling fairly good on Saturday when she went to a party, but she woke up  morning feeling extremely distraught and dysphoric, and felt the same way on Monday and could not go to work. She was having some suicidal thoughts on  as well as Monday morning, but mostly passive thoughts about wishing she were dead and no real plan or intention act on those thoughts. The suicidal thoughts were very ego-dystonic as well. Her PCP referred her to the emergency room and hospitalization was recommended. She states that her neurovegetative functioning has generally been adequate, but she does report some middle insomnia a lot of nights. She states, however, that her appetite and energy level are generally adequate except when her mood drops and her at that point, her energy level is low. She has been more tearful, often spontaneously, and she also feels very anhedonic when her mood dips into periods of dysphoria.   More recently, she has felt helpless and hopeless during those episodes, which is a bit unusual for her. She denies that there is any particular trigger or precipitants to these episodes, and they seemed to happen spontaneously. She did have a breakup with a long-term relationship a little over a month ago and had to move in with her sister, which she states that the situation is fairly good for her, that she is still close friends with her former partner, and that she does not feel that, that issue has any real impact on her mood changes. She denies her symptoms consistent with full elder. She states that when her mood is \"up\", it appears to be slightly elevated compared to the people around her, but she denies that her energy level or her thoughts are greatly accelerated or increased. She denies having any feelings of euphoria, any decreased need for sleep, or any increased levels of motor activity. However, she does clearly indicate that her mood seems to spontaneously fluctuate on its own. She does use edible THC every night, to help her sleep and deal with some mild restless legs syndrome issues. She also drinks a beer, may be two most days, but she denies that it is heavy or excessive. She denies any other drug use. PAST PSYCHIATRIC HISTORY:  She did have some therapy many years ago briefly, but otherwise has not had any prior treatment including no medication trials. PAST MEDICAL HISTORY:  1. Hyperlipidemia. 2.  Prediabetic. 3.  History of uterine fibroids which needs surgery. 4.  Possible restless legs syndrome. MEDICATIONS ON ADMISSION:  1. Crestor 20 mg daily. 2.  Aspirin 81 mg daily. 3.  Sinus medicine p.r.n. ALLERGIES:  PENICILLIN. FAMILY HISTORY:  She states that a niece and sister both have issues with anxiety. She does not know anyone who has any type of mood disorder. SOCIAL HISTORY:  She is single and has never been  and has no children.   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 She currently lives with her twin sister, but did have a long-term relationship with her partner until that dissolved about a month or so ago. She does not smoke. MENTAL STATUS EXAM:  The patient was noted to be a casually dressed, adequately groomed 72-year-old who appeared her stated age. She had a clean-shaven head but otherwise no distinguishing physical features or mannerisms. She was very pleasant and cooperative, and fairly open and forthright in providing information. She endorsed some recent depressive symptoms but states that her mood is already \"better\", and she denies feeling dysphoric, hopeless, or having any suicidal thoughts currently. There is no evidence of any elder or hypomania despite her fluctuating mood. Similarly, there is no evidence of any psychosis such as hallucinations or delusional thinking. Her thoughts were fairly organized and coherent. Her judgment and insight appears to be good as well. Cognitively, there were no deficits noted, and her fund of knowledge appeared to be average. DIAGNOSTIC IMPRESSION:  AXIS I:  1. Major Depression, recurrent, moderate with some atypical features (F33.1). 2.  Consider mild Bipolar disorder type 2 (F31.81). AXIS II:  Deferred. Axis III:  Hyperlipidemia; uterine fibroids. AXIS IV:  Stressors are moderate (breakup of relationship and recent move). AXIS V:  Global Assessment of Functioning currently is 50-55. PLAN:  1. We will admit the patient for further supportive treatment, close observation, increased structure, and involvement within the groups and milieu. 2.  After some discussion about her diagnosis and treatment options, she was agreeable to starting a trial on Celexa, taking 10 mg now and then 20 mg daily starting tomorrow.   3.  I did educate her about the need to watch for any increased emotional instability given the slight chance there is an element of bipolar disorder present. 4.  Estimated length of stay will likely only be one to two more days. I certify that this patient's inpatient psychiatric hospital admission is medically necessary for treatment which could reasonably be expected to improve her condition or for diagnostic study. The inpatient psychiatric services are provided while she is under the care of a physician and are included in the individualized plan of care.       Melinda Villarreal MD      [id number]  D:  03/08/2022 10:24  T:  03/08/2022 11:32  JOB #:  [id number]

## 2022-03-08 NOTE — PROGRESS NOTES
Patient Goal: To feel good. Patient appetite was good, no physical pain, no suicidal thoughts. Patient stated that she has no depression, anxiety level 2.   03/08/22 1307   [address] [facility] meeting   Attendance Attended   Number of participants 3   Time in 1030   Time out 1100   Total Time 30   MTP problem Depression   Interventions/techniques Provided feedback   Follows directions Followed directions   Interactions Interacted appropriately   Mental Status Calm;Depressed   Behavior/appearance Cooperative   Suicide Risk Factors no suicidal thoughts   Goals Achieved Able to listen to others; Able to receive feedback

## 2022-03-08 NOTE — BSMART NOTE
Pt accepted to [address] by Dr. Mateusz Miller to room# 234-1. Nursing report to [phone]. Staff updated.

## 2022-03-08 NOTE — BH NOTES
Pt arrived on unit at 2018 on a Voluntary Admission from [facility] ED. Pt is alert and oriented X and Dr. Luis Anderson , Dr Adrianna Neville and Nursing supervisor notified. 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 Pt drinks alcohol daily. Counseled patient on alcohol use/abuse. Treatment to focus on decreasing SI, decreasing depression, increasing coping skills, medication management, management of anxiety, and group therapy. Pt placed on fall prevention, and q 15 minute safety checks.

## 2022-03-09 VITALS
HEART RATE: 86 BPM | BODY MASS INDEX: 33.49 KG/M2 | HEIGHT: 63 IN | DIASTOLIC BLOOD PRESSURE: 69 MMHG | OXYGEN SATURATION: 97 % | RESPIRATION RATE: 16 BRPM | SYSTOLIC BLOOD PRESSURE: 135 MMHG | WEIGHT: 189 LBS | TEMPERATURE: 96.9 F

## 2022-03-09 PROCEDURE — 74011250637 HC RX REV CODE- 250/637: Performed by: PSYCHIATRY & NEUROLOGY

## 2022-03-09 PROCEDURE — 99239 HOSP IP/OBS DSCHRG MGMT >30: CPT | Performed by: PSYCHIATRY & NEUROLOGY

## 2022-03-09 RX ORDER — TRAZODONE HYDROCHLORIDE 50 MG/1
50 TABLET ORAL
Qty: 30 TABLET | Refills: 1 | Status: SHIPPED | OUTPATIENT
Start: 2022-03-09 | End: 2022-03-22

## 2022-03-09 RX ORDER — CITALOPRAM 20 MG/1
20 TABLET, FILM COATED ORAL DAILY
Qty: 30 TABLET | Refills: 1 | Status: SHIPPED | OUTPATIENT
Start: 2022-03-10 | End: 2022-03-22

## 2022-03-09 RX ADMIN — ASPIRIN 81 MG: 81 TABLET, COATED ORAL at 08:28

## 2022-03-09 RX ADMIN — CITALOPRAM HYDROBROMIDE 20 MG: 20 TABLET ORAL at 08:28

## 2022-03-09 RX ADMIN — CETIRIZINE HYDROCHLORIDE 10 MG: 10 TABLET, FILM COATED ORAL at 08:30

## 2022-03-09 NOTE — DISCHARGE INSTRUCTIONS
Patient Education        Learning About Depression Screening  What is depression screening? Depression screening is a way to see if you have depression symptoms. It may be done by a doctor or counselor. It's often part of a routine checkup. That's because your mental health is just as important as your physical health. Depression is a medical illness that affects how you feel, think, and act. You may:  · Have less energy. · Lose interest in your daily activities. · Feel sad and grouchy for a long time. Depression is very common. It affects men and women of all ages. Many things can trigger depression. Some people become depressed after they have a stroke or find out they have a major illness like cancer or heart disease. The death of a loved one, a breakup, or changes in the natural brain chemicals may lead to depression. It can run in families. Most experts believe that a combination of inherited genes and stressful life events can cause it. What happens during screening? You may be asked to fill out a form about your depression symptoms. You and the doctor will discuss your answers. The doctor may ask you more questions to learn more about how you think, act, and feel. What happens after screening? If you have signs of depression, your doctor will talk to you about your options. Doctors usually treat depression with medicines or counseling. Often, combining the two works best. Many people don't get help because they think that they'll get over the depression on their own. But people with depression may not get better unless they get treatment. Many people feel embarrassed or ashamed about having depression. But it isn't a sign of personal weakness. It's not a character flaw. A person who is depressed is not \"crazy. \" Depression is caused by changes in the brain. A serious symptom of depression is thinking about death or suicide.  If you or someone you care about talks about this or about feeling hopeless, get help right away. It's important to know that depression can be treated. The first step toward feeling better is often just seeing that the problem exists. Where can you learn more? Go to http://www.gray.com/  Enter T185 in the search box to learn more about \"Learning About Depression Screening. \"  Current as of: June 16, 2021               Content Version: 13.2  © 2006-2022 [employer]. Care instructions adapted under license by [employer] (which disclaims liability or warranty for this information). If you have questions about a medical condition or this instruction, always ask your healthcare professional. [place] [address] any warranty or liability for your use of this information. Patient Education        Learning About Mood Disorders  What are mood disorders? Mood disorders are medical problems that affect how you feel. They can impact your moods, thoughts, and actions. Mood disorders include:  · Depression. This causes you to feel sad or hopeless for much of the time. · Bipolar disorder. This causes extreme mood changes from manic episodes of very high energy to extreme lows of depression. · Seasonal affective disorder (SAD). This is a type of depression that affects you during the same season each year. Most often people experience SAD during the fall and winter months when days are shorter and there is less light. What are the symptoms? Depression  You may:  · Feel sad or hopeless nearly every day. · Lose interest in or not get pleasure from most daily activities. You feel this way nearly every day. · Have low energy, changes in your appetite, or changes in how well you sleep. · Have trouble concentrating. · Think about death and suicide. Keep the numbers for these national suicide hotlines: 4-808-826-TALK (4-462.182.8916) and 6-534-ZLAIJCT (3-769.420.3407).  If you or someone you know talks about suicide or feeling hopeless, get help right away. Bipolar disorder  Symptoms depend on your mood swings. You may:  · Feel very happy, energetic, or on edge. · Feel like you need very little sleep. · Feel overly self-confident. · Do impulsive things, such as spending a lot of money. · Feel sad or hopeless. · Have racing thoughts or trouble thinking and making decisions. · Lose interest in things you have enjoyed in the past.  · Think about death and suicide. Keep the numbers for these national suicide hotlines: 9-067-978-TALK (0-442.889.5534) and 3-367-DUEFCSA (7-166.296.4163). If you or someone you know talks about suicide or feeling hopeless, get help right away. Seasonal affective disorder (SAD)  Symptoms come and go at about the same time each year. For most people with SAD, symptoms come during the winter when there is less daylight. You may:  · Feel sad, grumpy, gallardo, or anxious. · Lose interest in your usual activities. · Eat more and crave carbohydrates, such as bread and pasta. · Gain weight. · Sleep more and feel drowsy during the daytime. How are mood disorders treated? Mood disorders can be treated with medicines or counseling, or a combination of both. Medicines for depression and SAD may include antidepressants. Medicines for bipolar disorder may include:  · Mood stabilizers. · Antipsychotics. · Benzodiazepines. Counseling may involve cognitive-behavioral therapy. It teaches you how to change the ways you think and behave. This can help you stop thinking bad thoughts about yourself and your life. Light therapy is the main treatment for SAD. This therapy uses a special kind of lamp. You let the lamp shine on you at certain times, usually in the morning. This may help your symptoms during the months when there is less sunlight. Healthy lifestyle  Healthy lifestyle changes may help you feel better. · Be active often. You might try walking or strength training. · Eat a healthy diet.  Include fruits, vegetables, lean proteins, and whole grains in your diet each day. · Keep a regular sleep schedule. Try for 8 hours of sleep a night. · Find ways to manage stress, such as relaxation exercises. · Avoid alcohol and illegal drugs. Follow-up care is a key part of your treatment and safety. Be sure to make and go to all appointments, and call your doctor if you are having problems. It's also a good idea to know your test results and keep a list of the medicines you take. Where can you learn more? Go to http://www.gray.com/  Enter Z126 in the search box to learn more about \"Learning About Mood Disorders. \"  Current as of: June 16, 2021               Content Version: 13.2  © 2006-2022 [employer]. Care instructions adapted under license by [employer] (which disclaims liability or warranty for this information). If you have questions about a medical condition or this instruction, always ask your healthcare professional. [place] [address] any warranty or liability for your use of this information. BEHAVIORAL HEALTH NURSING DISCHARGE NOTE      The following personal items collected during your admission are returned to you:   Dental Appliance: Dental Appliances: None  Vision: Visual Aid: Glasses,With patient  Hearing Aid:    Jewelry: Jewelry: None  Clothing: Clothing: Undergarments,Shirt,Footwear,Pants,Socks  Other Valuables: Other Valuables: Cell Phone  Valuables sent to safe:        PATIENT INSTRUCTIONS:    What to do at Home:    You may resume normal activities. Avoid making any critical decisions for at least 24-hours. Recommended diet: Regular Diet. Recommended activity: Activity as tolerated. National Suicide Prevention Line: 5-686-597-712.784.2686. [place] 7-255.165.4886. If you have problems relating to your recovery, call your physician.     The discharge information has been reviewed with the patient. The patient verbalized understanding.

## 2022-03-09 NOTE — PROGRESS NOTES
Problem: Depressed Mood (Adult/Pediatric)  Goal: *STG: Participates in treatment plan  Outcome: Progressing Towards Goal  Goal: *STG: Attends activities and groups  Outcome: Progressing Towards Goal  Goal: *STG: Remains safe in hospital  Outcome: Progressing Towards Goal  Goal: *STG: Complies with medication therapy  Outcome: Progressing Towards Goal  Goal: *LTG: Returns to previous level of functioning and participates with after care plan  Outcome: Progressing Towards Goal     Problem: Patient Education: Go to Patient Education Activity  Goal: Patient/Family Education  Outcome: Progressing Towards Goal

## 2022-03-09 NOTE — GROUP NOTE
[facility]AD[facility]  GROUP DOCUMENTATION INDIVIDUAL                                                                          Group Therapy Note    Date: 3/9/2022    Group Start Time: 0900  Group End Time: 0950  Group Topic: Process Group - Inpatient    [address] [facility]    [place], [address] [facility] [address] [facility] GROUP DOCUMENTATION GROUP    Group Therapy Note    Focus of session was on understanding and implementing the worry tree as a way to eliminate worry about things in our lives that we currently cannot do anything about. We discussed how about 90% of our current worries are about the unimportant, the unlikely, and the unresolved. I shared with group how the worry tree can be effective for dealing with worries to let them go, even if they have to do it over and over. I had group members share a worry that is able to be resolved and one that is not at this time and how to practice letting go of that worry in order to free up emotional energy and space in our minds. Group members were given an assignment to let go of one worry this week that is resolvable or one that is not. Attendance: Attended     Patient's Goal:   Verbalize depressive feelings and share possible caus     Interventions/techniques: Informed, Validated, Reinforced and Supported    Follows Directions:  Followed directions    Interactions: Interacted appropriately    Mental Status: Calm and Congruent    Behavior/appearance: Attentive, Cooperative and Motivated    Goals Achieved: Able to engage in interactions, Able to listen to others, Able to reflect/comment on own behavior, Able to receive feedback, Able to self-disclose, Discussed coping, Discussed discharge plans, Discussed self-esteem issues, Identified feelings, Identified triggers, Identified relapse prevention strategies, Identified medication adherence strategies and Identified resources and support systems      Additional Notes:  Pt participated in the group activity and engaged with the other members. 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   She is interested in applying for other jobs and will follow through with seeing her PCP and pursue counseling    Flako Richards

## 2022-03-09 NOTE — DISCHARGE SUMMARY
[facility] DISCHARGE    Name:  Raquel Weston  MR#:  [id number]  :  1960  ACCOUNT #:  [de-identified]  ADMIT DATE:  2022  DISCHARGE DATE:  2022    [facility] DISCHARGE SUMMARY    NOTE:  Greater than 35 minutes was spent in preparation of this discharge. DISCHARGE DIAGNOSES:  AXIS I:  1. Major Depression, likely recurrent, moderate with some atypical features (F33.1). 2.  Consider mild Bipolar disorder type II (F31.81). AXIS II:  Deferred. AXIS III:  Hyperlipidemia; uterine fibroids with surgery pending; possible restless legs syndrome. AXIS IV:  Stressors are moderate (breakup of long-term relationship and recent move). AXIS V:  Global Assessment of Functioning at discharge is 75. DISCHARGE MEDICATIONS:  1. Celexa 20 mg daily (new) - - #30 pills with one refill. 2.  Trazodone 50 mg at bedtime p.r.n. for sleep (new) - - #30 pills with one refill. 3.  Aspirin 81 mg daily. 4.  Crestor 20 mg daily. DISPOSITION/FOLLOWUP PLANS:  The patient is being discharged in stable condition later today on 2022. She will be following up with the primary care physician, Dr. Enedina Mao, at the [facility] for medication management, but I let the patient know that I would be available for a phone consultation if needed from either her or Dr. Brenda Abebe. Additionally, we will be providing the names of several female therapist in the area and she indicated that she would like to pursue outpatient therapy as well which was strongly encouraged. HOSPITAL COURSE:  As noted in the admission note, the patient is a 53-year-old single female with little in the way of any past psychiatric treatment, but who was admitted voluntarily from the [facility] Emergency Room due to increasing depressive symptoms that had been building off and on for the past year, as well as some recent suicidal thoughts which were distressing to her.   She stated that she has been dealing with depressive episodes and mood fluctuations most of her adult life, but that it particularly worsened over the past year without any real precipitant or trigger. She did note that she had broken up with her long-term partner about a month ago, but she states that this was amicable and neutral, and she did not feel it was a trigger or precipitant to any of her mood difficulties. She was having classic symptoms of severe depression including some feelings of hopelessness, but she had no real suicidal plan or intent and most of her \"suicidal \"thoughts were more passive thoughts about just wishing she were dead. She has never made any attempts to try to harm herself. However, on further inquiry, it appears as though her pattern is somewhat atypical in that she tends to cycle fairly frequently affectively, albeit mildly and not to the level that would qualify for a full diagnosis of bipolar disorder. She states that she will often feel fairly good mood-wise as well as activity and energy level-wise for 1-3 days at a time, and that her mood will drop without any particular precipitant into a state of depression which lasts at least that long if not longer. We did start a trial on Celexa having her take 20 mg daily and she seemed to tolerate it quite well with only some minimal nausea noted. I did explain to her that there is a chance she may have an element of bipolar disorder beneath the surface and that antidepressant could possibly cause some increased mood instability or rapid cycling mood swings, in that case it may need to be decreased or stopped, and a mood stabilizer may need to be considered. However, at this point, she had not really tried any medicines at all previously and it was appropriate to proceed with a mild antidepressant to start with. She had no other psychiatric issues, and in fact, her mood was fairly euthymic and stable for the brief time she was here.   She had no anxiety issues or any history of that, nor was there any evidence of elder, hypomania, or psychosis. She does use edible THC each night for sleeping, but that did not appear to be an issue in her current presentation. She also drinks about one beer most days, but again that did not appear to be of a factor in her hospitalization. She had no medical or physical issues and was felt to be medically stable for discharge when she was released on 03/09/2022. LABORATORY DATA:  Her laboratory tests on admission were entirely unremarkable. She did have an elevated AST of 41, but other liver enzymes were normal.  Her urinalysis did show some blood, but she does have uterine fibroids which need to be surgically removed. Her urine drug screen was positive for THC, but blood alcohol level was zero.       Marj Colin MD      [id number]  D:  03/09/2022 10:06  T:  03/09/2022 12:21  JOB #:  [id number]  CC:  Valerie MAO DO

## 2022-03-09 NOTE — BH NOTES
Behavioral Health Transition Record to Provider    Patient Name: Clara Chahal  YOB: 1960  Medical Record Number: [id number]  Date of Admission: 3/7/2022  Date of Discharge: 3/9/22    Attending Provider: Raquel Miller MD  Discharging Provider: Amanda Wilkerson  To contact this individual call [phone] and ask the  to page. If unavailable, ask to be transferred to [address] [place] [address] Provider on call. [facility] Provider will be available on call 24/7 and during holidays. Primary Care Provider: Rocio Allison DO    Allergies   Allergen Reactions    Pcn [Penicillins] Rash       Reason for Admission: SI    Admission Diagnosis: MDD (major depressive disorder) [F32.9]    * No surgery found *    Results for orders placed or performed during the hospital encounter of 03/07/22   COVID-19 WITH INFLUENZA A/B   Result Value Ref Range    SARS-CoV-2 Not detected NOTD      Influenza A by PCR Not detected NOTD      Influenza B by PCR Not detected NOTD     CBC WITH AUTOMATED DIFF   Result Value Ref Range    WBC 10.9 3.6 - 11.0 K/uL    RBC 3.77 (L) 3.80 - 5.20 M/uL    HGB 11.9 11.5 - 16.0 g/dL    HCT 36.0 35.0 - 47.0 %    MCV 95.5 80.0 - 99.0 FL    MCH 31.6 26.0 - 34.0 PG    MCHC 33.1 30.0 - 36.5 g/dL    RDW 13.6 11.5 - 14.5 %    PLATELET 253 049 - 186 K/uL    MPV 9.8 8.9 - 12.9 FL    NRBC 0.0 0  WBC    ABSOLUTE NRBC 0.00 0.00 - 0.01 K/uL    NEUTROPHILS 56 32 - 75 %    LYMPHOCYTES 36 12 - 49 %    MONOCYTES 7 5 - 13 %    EOSINOPHILS 1 0 - 7 %    BASOPHILS 0 0 - 1 %    IMMATURE GRANULOCYTES 0 0.0 - 0.5 %    ABS. NEUTROPHILS 6.1 1.8 - 8.0 K/UL    ABS. LYMPHOCYTES 3.9 (H) 0.8 - 3.5 K/UL    ABS. MONOCYTES 0.8 0.0 - 1.0 K/UL    ABS. EOSINOPHILS 0.1 0.0 - 0.4 K/UL    ABS. BASOPHILS 0.0 0.0 - 0.1 K/UL    ABS. IMM.  GRANS. 0.0 0.00 - 0.04 K/UL    DF SMEAR SCANNED      PLATELET COMMENTS ADEQUATE PLATELETS      RBC COMMENTS ANISOCYTOSIS  1+       METABOLIC PANEL, COMPREHENSIVE   Result Value Ref Range    Sodium 139 136 - 145 mmol/L    Potassium 3.5 3.5 - 5.1 mmol/L    Chloride 101 97 - 108 mmol/L    CO2 27 21 - 32 mmol/L    Anion gap 11 5 - 15 mmol/L    Glucose 93 65 - 100 mg/dL    BUN 13 6 - 20 MG/DL    Creatinine 0.80 0.55 - 1.02 MG/DL    BUN/Creatinine ratio 16 12 - 20      GFR est AA >60 >60 ml/min/1.73m2    GFR est non-AA >60 >60 ml/min/1.73m2    Calcium 9.2 8.5 - 10.1 MG/DL    Bilirubin, total 0.2 0.2 - 1.0 MG/DL    ALT (SGPT) 38 12 - 78 U/L    AST (SGOT) 41 (H) 15 - 37 U/L    Alk.  phosphatase 80 45 - 117 U/L    Protein, total 8.7 (H) 6.4 - 8.2 g/dL    Albumin 4.1 3.5 - 5.0 g/dL    Globulin 4.6 (H) 2.0 - 4.0 g/dL    A-G Ratio 0.9 (L) 1.1 - 2.2     LIPASE   Result Value Ref Range    Lipase 76 73 - 393 U/L   ETHYL ALCOHOL   Result Value Ref Range    ALCOHOL(ETHYL),SERUM <10 <10 MG/DL   TSH 3RD GENERATION   Result Value Ref Range    TSH 1.85 0.36 - 3.74 uIU/mL   T4, FREE   Result Value Ref Range    T4, Free 0.7 (L) 0.8 - 1.5 NG/DL   URINALYSIS W/ RFLX MICROSCOPIC   Result Value Ref Range    Color YELLOW/STRAW      Appearance CLEAR CLEAR      Specific gravity 1.025 1.003 - 1.030      pH (UA) 6.0 5.0 - 8.0      Protein Negative NEG mg/dL    Glucose Negative NEG mg/dL    Ketone 15 (A) NEG mg/dL    Bilirubin Negative NEG      Blood LARGE (A) NEG      Urobilinogen 0.2 0.2 - 1.0 EU/dL    Nitrites Negative NEG      Leukocyte Esterase Negative NEG     DRUG SCREEN, URINE   Result Value Ref Range    AMPHETAMINES Negative NEG      BARBITURATES Negative NEG      BENZODIAZEPINES Negative NEG      COCAINE Negative NEG      METHADONE Negative NEG      OPIATES Negative NEG      PCP(PHENCYCLIDINE) Negative NEG      THC (TH-CANNABINOL) Positive (A) NEG      Drug screen comment (NOTE)    URINE MICROSCOPIC ONLY   Result Value Ref Range    WBC 0-4 0 - 4 /hpf    RBC 20-50 0 - 5 /hpf    Epithelial cells FEW FEW /lpf    Bacteria Negative NEG /hpf       Immunizations administered during this encounter:   Immunization History Administered Date(s) Administered    COVID-19, Pfizer Purple top, DILUTE for use, 12+ yrs, 30mcg/0.3mL dose 04/13/2021, 05/03/2021       Screening for Metabolic Disorders for Patients on Antipsychotic Medications  (Data obtained from the EMR)    Estimated Body Mass Index  Estimated body mass index is 33.48 kg/m² as calculated from the following:    Height as of this encounter: 5' 3\" (1.6 m). Weight as of this encounter: 85.7 kg (189 lb). Vital Signs/Blood Pressure  Visit Vitals  /69 (BP 1 Location: Left arm, BP Patient Position: Sitting)   Pulse 86   Temp 96.9 °F (36.1 °C)   Resp 16   Ht 5' 3\" (1.6 m)   Wt 85.7 kg (189 lb)   SpO2 97%   Breastfeeding No   BMI 33.48 kg/m²       Blood Glucose/Hemoglobin A1c  Lab Results   Component Value Date/Time    Glucose 93 03/07/2022 05:00 PM       No results found for: HBA1C, [id number]     Lipid Panel  No results found for: CHOL, CHOLX, CHLST, CHOLV, 358305, HDL, HDLP, LDL, LDLC, DLDLP, TGLX, TRIGL, TRIGP, CHHD, CHHDX     Discharge Diagnosis: Major Depression, recurrent, moderate with some atypical features    Discharge Plan: Discharge plan of care/case management plan validated with provider discharge order. Discharge Medication List and Instructions:   Current Discharge Medication List      START taking these medications    Details   citalopram (CELEXA) 20 mg tablet Take 1 Tablet by mouth daily. Qty: 30 Tablet, Refills: 1  Start date: 3/10/2022      traZODone (DESYREL) 50 mg tablet Take 1 Tablet by mouth nightly as needed for Sleep (For insomnia). Qty: 30 Tablet, Refills: 1  Start date: 3/9/2022         CONTINUE these medications which have NOT CHANGED    Details   rosuvastatin (CRESTOR) 20 mg tablet rosuvastatin 20 mg tablet   Take 1 tablet every day by oral route. aspirin delayed-release 81 mg tablet Adult Aspirin Regimen 81 mg tablet,delayed release   Take 1 tablet every day by oral route.          STOP taking these medications chlorpheniramine-phenylephrine (Sinus and Allergy PE) 4-10 mg tab Comments:   Reason for Stopping:               Unresulted Labs (24h ago, onward)            None        To obtain results of studies pending at discharge, please contact     Follow-up Information     Follow up With Specialties Details Why Contact Info Good, Wallene Nageotte, DO Internal Medicine, Pediatric Medicine Go to For medication management [address] [phone]            Advanced Directive:   Does the patient have an appointed surrogate decision maker? No  Does the patient have a Medical Advance Directive? No  Does the patient have a Psychiatric Advance Directive? No  If the patient does not have a surrogate or Medical Advance Directive AND Psychiatric Advance Directive, the patient was offered information on these advance directives Yes and Patient will complete at a later time    Patient Instructions: Please continue all medications until otherwise directed by physician. Tobacco Cessation Discharge Plan:   Is the patient a smoker and needs referral for smoking cessation? No  Patient referred to the following for smoking cessation with an appointment? Not applicable     Patient was offered medication to assist with smoking cessation at discharge? Not applicable  Was education for smoking cessation added to the discharge instructions? Not applicable    Alcohol/Substance Abuse Discharge Plan:   Does the patient have a history of substance/alcohol abuse and requires a referral for treatment? No  Patient referred to the following for substance/alcohol abuse treatment with an appointment? Not applicable  Patient was offered medication to assist with alcohol cessation at discharge? Not applicable  Was education for substance/alcohol abuse added to discharge instructions?  Not applicable    Patient discharged to Home; discussed with patient/caregiver and provided to the patient/caregiver either in hard copy or electronically.

## 2022-03-09 NOTE — BH NOTES
Patient will be discharged to home this date with her family. She will continue to follow up with Dr. Abdi Carlos for medication management. She was given a list of therapist in the area if chooses to utilize them. She was given a copy of her [address] transition and a copy was routed to her PCP.

## 2022-03-09 NOTE — BH NOTES
Discharge Note:    Patient discharged home today. Patient is alert and oriented x 4. Patient cooperative and pleasant. Patient in dayroom laughing and interacting appropriately with peers and staff. Patient denies SI/HI/AVH/pain. No delusional statements made. Patient is compliant with medications and given Zyrtec 10 mg po at 0830 for allergies with positive effects. Patient appetite good ate 100% breakfast and 100% lunch plus a snack. Patient attended and engaged in groups with prompting. Patient in no apparent distress all vital signs within normal limits. RN reviewed discharge orders and instructions with patient which included: medication drug name, purpose, doses, administration times, route, and adverse effects. Patient verbalized understanding and provided written copies of discharge orders and instructions. Prescriptions sent into [address] [facility] in [place], [place]. Patient left the unit at 1246 wearing a face mask and accompanied by staff. All personal valuables and belongings sent with patient.

## 2022-03-09 NOTE — PROGRESS NOTES
Problem: Falls - Risk of  Goal: *Absence of Falls  Description: Document Amanda Zafar Fall Risk and appropriate interventions in the flowsheet.   Outcome: Progressing Towards Goal  Note: Fall Risk Interventions:            Medication Interventions: Teach patient to arise slowly                   Problem: Depressed Mood (Adult/Pediatric)  Goal: *STG: Attends activities and groups  Outcome: Progressing Towards Goal  Goal: *STG: Remains safe in hospital  Outcome: Progressing Towards Goal  Goal: *STG: Complies with medication therapy  Outcome: Progressing Towards Goal

## 2022-03-09 NOTE — BH NOTES
[place] [address] ([phone])            March 9, 2022      To Whom It May Concern: This letter is verification that Simon Mccarthy was hospitalized at [facility] from March 8 through March 9,2022 she may return back to work with no restrictions Friday March 11, 2022.        Sincerely,      Sonam Yang,

## 2022-03-09 NOTE — PROGRESS NOTES
Patient goal: to go home today and back to work. Patient appetite was god, no physical pain, Patient does not have any suicidal thoughts. Patient stated that she has depression level 1 and anxiety level 2   03/09/22 [facility] 125 meeting   Attendance Attended   Number of participants 2   Time in 1030   Time out 1100   Total Time 30   MTP problem Depression   Interventions/techniques Provided feedback   Follows directions Followed directions   Interactions Interacted appropriately   Behavior/appearance Cooperative   Suicide Risk Factors no suicidal thoughts. Goals Achieved Able to listen to others; Able to reflect/comment on own behavior;Able to receive feedback

## 2022-03-09 NOTE — ROUTINE PROCESS
Shift change report given to Jerzy Carpenter, re: SBAR. Medications, and behavior.   [place] Fall Risk Score (1)

## 2022-03-09 NOTE — BH NOTES
Affect blunted, mood depressed. Pt attended and participated in scheduled group activities. Pt was social and appropriate with staff and peers. Pt interacted well. Pt vital signs stable with no complaints of pain. Pt denies SI/HI/AVH. Pt was compliant with medications and did request a PRN. Pt is in no apparent distress at this time and made no delusional statements. Pt ate 100% of snack. Pt rested in her bed with her eyes closed for 7 hours.     PRN Medication Documentation    Specific patient behavior that led to need for PRN medication: restless  Staff interventions attempted prior to PRN being given: Pt requested  PRN medication given: Trazodone 50 mg PO @ 2104  Patient response/effectiveness of PRN medication: Pt rested

## 2022-03-09 NOTE — PROGRESS NOTES
03/08/22 2156 2000 [facility] meeting   Attendance Attended   Number of participants 4   Time in 2000   Time out 2030   Total Time 30   Interventions/techniques Supported   Follows directions Followed directions   Interactions Interacted appropriately   Mental Status Calm   Behavior/appearance Cooperative   Suicide Risk Factors She has no thoughts of suicide. Suicide Protective Factors Denies intent   Goals Achieved Able to engage in interactions     She has no anxiety, depression, or pain at this time.

## 2022-03-14 ENCOUNTER — ANESTHESIA EVENT (OUTPATIENT)
Dept: SURGERY | Age: 62
End: 2022-03-14
Payer: COMMERCIAL

## 2022-03-14 NOTE — ANESTHESIA PREPROCEDURE EVALUATION
Relevant Problems   NEUROLOGY   (+) Major depressive disorder, recurrent episode, moderate (HCC)       Anesthetic History   No history of anesthetic complications     Pertinent negatives: No PONV       Review of Systems / Medical History  Patient summary reviewed, nursing notes reviewed and pertinent labs reviewed    Pulmonary  Within defined limits            Pertinent negatives: No asthma and smoker     Neuro/Psych         Psychiatric history (depression)  Pertinent negatives: No seizures and CVA   Cardiovascular              Hyperlipidemia  Pertinent negatives: No hypertension, past MI and dysrhythmias (BMI 33)  Exercise tolerance: >4 METS     GI/Hepatic/Renal  Within defined limits           Pertinent negatives: No GERD, liver disease and renal disease   Endo/Other        Obesity  Pertinent negatives: No diabetes   Other Findings            Physical Exam    Airway  Mallampati: II  TM Distance: 4 - 6 cm  Neck ROM: normal range of motion   Mouth opening: Normal     Cardiovascular    Rhythm: regular  Rate: normal         Dental    Dentition: Poor dentition  Comments: Extremely poor dentition with multiple very loose teeth on top on bottom in particular her bottom incisors and canines which are extremely loose and about to fall out. Pulmonary  Breath sounds clear to auscultation               Abdominal  GI exam deferred       Other Findings            Anesthetic Plan    ASA: 2  Anesthesia type: general          Induction: Intravenous  Anesthetic plan and risks discussed with: Patient      Plan general anesthesia. Patient has no absolute contraindications to use of an LMA. Risks discussed and patient agrees with plan. GETA back-up if LMA placement unsuccessful. Anesthesia consent form reviewed. Patient given opportunity for questions and all questions answered. Anesthesia consent form signed by patient.      Addendum: pt denied any loose teeth but after induction and prior to LMA placement, noted extremely loose teeth on bottom (incisors and canines). Inherent high risk of tooth displacement due to poor dentition.

## 2022-03-18 PROBLEM — F33.1 MAJOR DEPRESSIVE DISORDER, RECURRENT EPISODE, MODERATE (HCC): Status: ACTIVE | Noted: 2022-03-07

## 2022-03-22 RX ORDER — LORATADINE 10 MG/1
10 TABLET ORAL DAILY
COMMUNITY

## 2022-03-22 RX ORDER — HYDROCORTISONE 25 MG/G
CREAM TOPICAL
COMMUNITY
Start: 2022-01-17 | End: 2023-01-17

## 2022-03-22 NOTE — PERIOP NOTES
[address] [place] [address]  SURGICAL PRE-ADMISSION INSTRUCTIONS    ARRIVAL  · You will be called the day before your surgery with your expected arrival time. · Sign in at the  of the hospital.  You will be directed to the Surgical Waiting Room. · Please arrive at your scheduled appointment time. You have been scheduled to arrive for your procedure one or two hours prior to the expected start time of your procedure. · Every effort will be made to minimize your wait but please be aware that unforeseen circumstances may affect our schedule. EATING  · DO NOT EAT OR DRINK ANYTHING AFTER MIDNIGHT ON THE EVENING BEFORE YOUR SURGERY OR ON THE DAY OF YOUR SURGERY except for your medications (as instructed) with a sip of water. · Do not use gum, mints or lozenges on the morning of your surgery. · Please do not smoke or chew tobacco before your surgery. MEDICATIONS   · none    STOP THESE MEDICATIONS AT THE TIMES LISTED BELOW  Aspirin ;  7 days before                                                   DRIVING/TRANSPORATION  · Have a responsible adult to drive you home from the hospital and to stay with you over night. Please have them plan to remain in the hospital during your surgery. Your surgery will not be done if you do not have a responsible adult to take you home and to stay with you. · If you have arranged for public transport, you must have a responsible adult to ride with you (who is not the ). · You may not drive for 24 hours after anesthesia. PREPARATION  · If you have a Living WiIl/Advance Directive, please bring a copy with you to scan into your chart. · Please DO NOT wear makeup or nail polish  · Please leave valuables at home,  DO NOT wear jewelry. · Wear loose, comfortable clothing that is large enough to cover a bulky dressing.     SPECIAL INSTRUCTIONS:  · none    Reviewed above preoperative instructions and answered questions by phone interview    Patient: Mary March   Date:     March 22, 2022  Time:   2:52 PM    RN:  Walter Healy RN    Date:     March 22, 2022  Time:   2:52 PM

## 2022-03-27 PROBLEM — R93.89 THICKENED ENDOMETRIUM: Status: ACTIVE | Noted: 2022-03-27

## 2022-03-27 PROBLEM — N95.0 POSTMENOPAUSAL BLEEDING: Status: ACTIVE | Noted: 2022-03-27

## 2022-03-27 NOTE — DISCHARGE INSTRUCTIONS
[address], [address], [address] [facility]    Outpatient Surgery Discharge Instructions      [facility] It is important that you take the medication exactly as they are prescribed.  Do not take other medications without consulting your doctor. Activity:   No lifting straining or exertional activities for 2 to 3 days.  You may take a shower tomorrow.  No driving for 2 to 3 days or while taking narcotics for pain.  Do not return to work for 2 to 4 days after procedure, if want/need to return to work sooner please call the [employer] office.  Nothing in vagina for 2 weeks      Recommended Diet: heart healthy low sugar     Follow-Up Care:   2 weeks with Dr. Rosy Bullard as scheduled.   [phone]    Additional Information:  If you experience any of the following symptoms then please call me or return to the emergency room if you cannot contact your doctor:   Increased vaginal bleeding   Fever   Chills   Nausea   Vomiting   Diarrhea   Change in mentation   Falling   Bleeding   Shortness of breath

## 2022-03-27 NOTE — H&P
Chief Complaint  Rm 1 Postmenopausal bleeding, endometrial thickening    HPI  Patient presents for further workup of postmenopausal bleeding and thickened endoemetrium by Hysteroscopy with Myosure Polypectomy & D&C  endometrium 6.5 mm on vaginal u/s  endometrial biopsy showed endometrial polyp    past history  Patient presents for postmenopausal bleeding  no period for about 2yrs- is bleeding today  no steroids, no new meds  1 pad a day, can be full  no cramping  bowel and bladder no complaint  having menses due to estroven, hot flashes better, stopped estroven 2 yrs ago, took Claus Gaurang for a year, told that caused her to bleed    more prediabetes    mother had menses till 58    Patient's Care Team  OB/GYN: Brandon Alejandra MD (VIRTUAL VISITS AVAILABLE): [facility], [address] [place] [address], Ph [phone], Fax [phone] NPI: [id number]  Primary Care Provider: [address] [place] [address] Avenue: [address], [address] [place] [address], Ph [phone], Fax [phone]  Patient's Pharmacies  [address]): [address] [place] [address], [address] [place] [address], Ph [phone], Fax [phone]    Vitals  Ht: 5 ft 3 in (160.02 cm) 03/24/2022 12:46 pm  Wt: 188 lbs (85.28 kg) 03/24/2022 12:48 pm  BMI: 33.3 03/24/2022 12:48 pm  BP: 128/78 03/24/2022 12:51 pm    Allergies  Reviewed Allergies  PENICILLINS: Other - childhood     Medications  Reviewed Medications  Adult Aspirin Regimen 81 mg tablet,delayed release  Take 1 tablet(s) every day by oral route. 01/26/22   entered Derwin December  cannabidiol (CBD) extract  Internal Note: gummies  01/26/22   entered Derwin December  magnesium  03/24/22   entered [place]  rosuvastatin 20 mg tablet  Take 1 tablet(s) every day by oral route. 01/26/22   entered Derwin December  Sinus and Allergy PE  01/26/22   entered Derwin December    Vaccines  Reviewed Vaccines  Vaccine Type Date Amt. Route Site [place] [address] Lot # Mfr. Exp.   Date VIS VIS  Given Vaccinator  COVID-19  COVID-19 (SARS-COV-2) vaccine, unspecified 22            Problems  Reviewed Problems  Subserous leiomyoma of uterus - Onset: 2022  Postmenopausal bleeding - Onset: 2022  Endometrium thickened - Onset: 2022    Hysteroscopy with Myosure Polypectomy & D&C [id number] 8:00am [facility] DAYAMI Goodman per anesthesia    Family History  Discussed Family History  Sister - Malignant tumor of breast (onset age: 36) ( age: 43)  Maternal Grandmother - Heart disease  Mother - Malignant tumor of lung  - smoker    Social History  Discussed Social History  Substance Use  Do you or have you ever smoked tobacco?: Never smoker  Do you or have you ever used any other forms of tobacco or nicotine?: No  What was the date of your most recent tobacco screening?: 2022  What is your level of alcohol consumption?: Moderate  Do you use any illicit or recreational drugs?: No  Education and Occupation  Are you currently employed?: Yes  Who is your employer?: [employer]  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 Date of Last Pap Smear: 2020 (Notes: normal per patient). Abnormal Pap: N. Age at Menarche: 17.  Sexually Active?: Y (Notes: same sex partner). STIs/STDs: N (Notes: 1980s - unsure which). Current Birth Control Method: N/A. Date of Last Mammogram: 2021 (Notes: normal per patient). Date of Last Colonoscopy: (Notes:  Cologuard normal per patient). Fibroids: Y (Notes: rt. and lt. subserosal noted 2022).     Obstetric History  Reviewed Obstetric History  TOTAL FULL PRE AB. I AB. S ECTOPICS MULTIPLE LIVING  1    1   0  SAB x1    Past Medical History  Discussed Past Medical History  Other: Y - Patient is a twin. ... has restless leg syndrome  Allergies (environmental/food): Y  Diabetes Mellitus: Y - 6.1 since 2006 per patient  Hypercholesterolemia (high cholesterol): Y  Screening  Name Score Notes  Caprini Risk Assessment (VTE Risk in the Surgical Patient) 3     ROS  Additionally reports: Except as noted in the HPI, the review of systems is negative for General, Breast, , Resp, GI, CV, Endo, MS, Psych and Heme. Physical Exam  Patient is a 51-year-old female. Constitutional: General Appearance: well developed and nourished and pleasant. Level of Distress: no acute distress. Ambulation: ambulating normally. Head: Head: normocephalic. Eyes: Extraocular Movements extraocular movements intact. Ears, Nose, Mouth, Throat: Ears normal hearing. Nose: no external nose lesions. Neck: Appearance FROM and supple. Thyroid: non-tender and no enlargement. Lungs / Chest: Respiratory effort: unlabored. Inspection / Palpation: no deformity, tenderness, or swelling. Auscultation: no wheezing or rales/crackles. Percussion: no dullness or hyperresonance. Cardiovascular: Rate And Rhythm: regular. Heart Sounds: no murmurs. Extremities: no cyanosis or edema. Abdomen: Inspection and Palpation: no tenderness, guarding, masses, rebound tenderness, or CVA tenderness and soft and non-distended. Liver: non-tender; no masses. Spleen: no masses. Hernia: none palpable. Female : External genitalia: no lesions, rash, or erythema. Vagina: no discharge, mass, or tenderness. Cervix: no discharge or cervical lesion. Uterus: midline, non-tender, no mass, and not enlarged. Adnexae: no adnexal mass or tenderness. Bladder and Urethra: no urethral discharge or mass. Lymphatics: Inguinal no inguinal lymphadenopathy.     Skin: General Skin no rash or suspicious lesions. Neurologic: Gait and Station: normal gait. Sensation: grossly intact. Motor: grossly intact. Mental Status Exam: Orientation oriented to person, place, and time. Assessment / Plan  1. Postmenopausal bleeding -  Plan: Hysteroscopy, Dilation and Curettage, possible Myosure Polypectomy      Discussed the procedure, reasons for the procedure and the risks and benefits of the procedure including infection, bleeding, transfusion risk (including infection, transfusion reaction or mismatch reaction), damage to bowel, bladder, nerves (due to incision or leg placement) and the rare risk of death with anesthesia or surgery. Discussed Limitations of D&C including Tissue sampled may not be the worst present in the uterus despite our best attempts and there is always a risk that the cervix is too tight and not let us into the endometrial cavity and procedure may need to be abandoned. Questions answered, will proceed with the surgery. N95.0: Postmenopausal bleeding    2. Endometrium thickened -  Plan: Hysteroscopy, Dilation and Curettage, possible Myosure Polypectomy      Discussed the procedure, reasons for the procedure and the risks and benefits of the procedure including infection, bleeding, transfusion risk (including infection, transfusion reaction or mismatch reaction), damage to bowel, bladder, nerves (due to incision or leg placement) and the rare risk of death with anesthesia or surgery. Discussed Limitations of D&C including Tissue sampled may not be the worst present in the uterus despite our best attempts and there is always a risk that the cervix is too tight and not let us into the endometrial cavity and procedure may need to be abandoned. Questions answered, will proceed with the surgery. R93.89: Abnormal findings on diagnostic imaging of other specified body structures    3.  Subserous leiomyoma of uterus -  discussed findings of fibroids  D25.2: Subserosal leiomyoma of uterus      Return to Denver Hollingshead, MD for Surgery at [facility] on 03/28/2022 at 08:00 AM  Anastacio Maradiaga MD for Post Op Exam at [facility] on 04/11/2022 at 02:15 PM  to see Daria Jensen CNM at [facility] on or around 01/26/2023

## 2022-03-28 ENCOUNTER — HOSPITAL ENCOUNTER (OUTPATIENT)
Age: 62
Setting detail: OUTPATIENT SURGERY
Discharge: HOME | End: 2022-03-28
Attending: OBSTETRICS & GYNECOLOGY | Admitting: OBSTETRICS & GYNECOLOGY
Payer: COMMERCIAL

## 2022-03-28 ENCOUNTER — ANESTHESIA (OUTPATIENT)
Dept: SURGERY | Age: 62
End: 2022-03-28
Payer: COMMERCIAL

## 2022-03-28 VITALS
HEART RATE: 99 BPM | TEMPERATURE: 97.5 F | HEIGHT: 63 IN | DIASTOLIC BLOOD PRESSURE: 78 MMHG | BODY MASS INDEX: 32.25 KG/M2 | WEIGHT: 182 LBS | RESPIRATION RATE: 16 BRPM | OXYGEN SATURATION: 95 % | SYSTOLIC BLOOD PRESSURE: 147 MMHG

## 2022-03-28 PROCEDURE — 77030033136 HC TBNG INFLO AQUILEX ST HOLO -C: Performed by: OBSTETRICS & GYNECOLOGY

## 2022-03-28 PROCEDURE — 74011000250 HC RX REV CODE- 250: Performed by: ANESTHESIOLOGY

## 2022-03-28 PROCEDURE — 76010000149 HC OR TIME 1 TO 1.5 HR: Performed by: OBSTETRICS & GYNECOLOGY

## 2022-03-28 PROCEDURE — 74011250636 HC RX REV CODE- 250/636: Performed by: ANESTHESIOLOGY

## 2022-03-28 PROCEDURE — 76060000033 HC ANESTHESIA 1 TO 1.5 HR: Performed by: OBSTETRICS & GYNECOLOGY

## 2022-03-28 PROCEDURE — 88305 TISSUE EXAM BY PATHOLOGIST: CPT

## 2022-03-28 PROCEDURE — 77030026236 HC DEV TISS RMVL MYOSUR HOLO -G1: Performed by: OBSTETRICS & GYNECOLOGY

## 2022-03-28 PROCEDURE — 77030033137 HC TBNG OUTFLO AQUILEX ST HOLO -B: Performed by: OBSTETRICS & GYNECOLOGY

## 2022-03-28 PROCEDURE — 77030040361 HC SLV COMPR DVT MDII -B: Performed by: OBSTETRICS & GYNECOLOGY

## 2022-03-28 PROCEDURE — 76210000006 HC OR PH I REC 0.5 TO 1 HR: Performed by: OBSTETRICS & GYNECOLOGY

## 2022-03-28 PROCEDURE — 74011250636 HC RX REV CODE- 250/636: Performed by: OBSTETRICS & GYNECOLOGY

## 2022-03-28 PROCEDURE — 77030040922 HC BLNKT HYPOTHRM STRY -A: Performed by: OBSTETRICS & GYNECOLOGY

## 2022-03-28 PROCEDURE — 2709999900 HC NON-CHARGEABLE SUPPLY: Performed by: OBSTETRICS & GYNECOLOGY

## 2022-03-28 RX ORDER — SODIUM CHLORIDE 0.9 % (FLUSH) 0.9 %
5-40 SYRINGE (ML) INJECTION EVERY 8 HOURS
Status: DISCONTINUED | OUTPATIENT
Start: 2022-03-28 | End: 2022-03-28 | Stop reason: HOSPADM

## 2022-03-28 RX ORDER — SODIUM CHLORIDE, SODIUM LACTATE, POTASSIUM CHLORIDE, CALCIUM CHLORIDE 600; 310; 30; 20 MG/100ML; MG/100ML; MG/100ML; MG/100ML
100 INJECTION, SOLUTION INTRAVENOUS CONTINUOUS
Status: DISCONTINUED | OUTPATIENT
Start: 2022-03-28 | End: 2022-03-28 | Stop reason: HOSPADM

## 2022-03-28 RX ORDER — OXYCODONE AND ACETAMINOPHEN 5; 325 MG/1; MG/1
1 TABLET ORAL
Status: DISCONTINUED | OUTPATIENT
Start: 2022-03-28 | End: 2022-03-28 | Stop reason: HOSPADM

## 2022-03-28 RX ORDER — LIDOCAINE HYDROCHLORIDE 20 MG/ML
INJECTION, SOLUTION EPIDURAL; INFILTRATION; INTRACAUDAL; PERINEURAL AS NEEDED
Status: DISCONTINUED | OUTPATIENT
Start: 2022-03-28 | End: 2022-03-28 | Stop reason: HOSPADM

## 2022-03-28 RX ORDER — BISMUTH SUBSALICYLATE 262 MG
1 TABLET,CHEWABLE ORAL DAILY
COMMUNITY

## 2022-03-28 RX ORDER — KETOROLAC TROMETHAMINE 30 MG/ML
INJECTION, SOLUTION INTRAMUSCULAR; INTRAVENOUS AS NEEDED
Status: DISCONTINUED | OUTPATIENT
Start: 2022-03-28 | End: 2022-03-28 | Stop reason: HOSPADM

## 2022-03-28 RX ORDER — SODIUM CHLORIDE, SODIUM LACTATE, POTASSIUM CHLORIDE, CALCIUM CHLORIDE 600; 310; 30; 20 MG/100ML; MG/100ML; MG/100ML; MG/100ML
25 INJECTION, SOLUTION INTRAVENOUS CONTINUOUS
Status: DISCONTINUED | OUTPATIENT
Start: 2022-03-28 | End: 2022-03-28 | Stop reason: HOSPADM

## 2022-03-28 RX ORDER — FENTANYL CITRATE 50 UG/ML
50 INJECTION, SOLUTION INTRAMUSCULAR; INTRAVENOUS
Status: DISCONTINUED | OUTPATIENT
Start: 2022-03-28 | End: 2022-03-28 | Stop reason: HOSPADM

## 2022-03-28 RX ORDER — SODIUM CHLORIDE 0.9 % (FLUSH) 0.9 %
5-40 SYRINGE (ML) INJECTION AS NEEDED
Status: DISCONTINUED | OUTPATIENT
Start: 2022-03-28 | End: 2022-03-28 | Stop reason: HOSPADM

## 2022-03-28 RX ORDER — BUPIVACAINE HYDROCHLORIDE 2.5 MG/ML
10 INJECTION, SOLUTION EPIDURAL; INFILTRATION; INTRACAUDAL ONCE
Status: DISCONTINUED | OUTPATIENT
Start: 2022-03-28 | End: 2022-03-28 | Stop reason: HOSPADM

## 2022-03-28 RX ORDER — DIPHENHYDRAMINE HYDROCHLORIDE 50 MG/ML
12.5 INJECTION, SOLUTION INTRAMUSCULAR; INTRAVENOUS
Status: DISCONTINUED | OUTPATIENT
Start: 2022-03-28 | End: 2022-03-28 | Stop reason: HOSPADM

## 2022-03-28 RX ORDER — PROPOFOL 10 MG/ML
INJECTION, EMULSION INTRAVENOUS AS NEEDED
Status: DISCONTINUED | OUTPATIENT
Start: 2022-03-28 | End: 2022-03-28 | Stop reason: HOSPADM

## 2022-03-28 RX ORDER — MIDAZOLAM HYDROCHLORIDE 1 MG/ML
INJECTION, SOLUTION INTRAMUSCULAR; INTRAVENOUS AS NEEDED
Status: DISCONTINUED | OUTPATIENT
Start: 2022-03-28 | End: 2022-03-28 | Stop reason: HOSPADM

## 2022-03-28 RX ORDER — FENTANYL CITRATE 50 UG/ML
INJECTION, SOLUTION INTRAMUSCULAR; INTRAVENOUS AS NEEDED
Status: DISCONTINUED | OUTPATIENT
Start: 2022-03-28 | End: 2022-03-28 | Stop reason: HOSPADM

## 2022-03-28 RX ORDER — HYDROMORPHONE HYDROCHLORIDE 2 MG/ML
0.5 INJECTION, SOLUTION INTRAMUSCULAR; INTRAVENOUS; SUBCUTANEOUS
Status: DISCONTINUED | OUTPATIENT
Start: 2022-03-28 | End: 2022-03-28 | Stop reason: HOSPADM

## 2022-03-28 RX ORDER — DEXAMETHASONE SODIUM PHOSPHATE 4 MG/ML
INJECTION, SOLUTION INTRA-ARTICULAR; INTRALESIONAL; INTRAMUSCULAR; INTRAVENOUS; SOFT TISSUE AS NEEDED
Status: DISCONTINUED | OUTPATIENT
Start: 2022-03-28 | End: 2022-03-28 | Stop reason: HOSPADM

## 2022-03-28 RX ORDER — ONDANSETRON 2 MG/ML
INJECTION INTRAMUSCULAR; INTRAVENOUS AS NEEDED
Status: DISCONTINUED | OUTPATIENT
Start: 2022-03-28 | End: 2022-03-28 | Stop reason: HOSPADM

## 2022-03-28 RX ORDER — GLYCOPYRROLATE 0.2 MG/ML
INJECTION INTRAMUSCULAR; INTRAVENOUS AS NEEDED
Status: DISCONTINUED | OUTPATIENT
Start: 2022-03-28 | End: 2022-03-28 | Stop reason: HOSPADM

## 2022-03-28 RX ADMIN — ONDANSETRON 4 MG: 2 INJECTION INTRAMUSCULAR; INTRAVENOUS at 08:17

## 2022-03-28 RX ADMIN — MIDAZOLAM 2 MG: 1 INJECTION INTRAMUSCULAR; INTRAVENOUS at 08:06

## 2022-03-28 RX ADMIN — LIDOCAINE HYDROCHLORIDE 75 MG: 20 INJECTION, SOLUTION EPIDURAL; INFILTRATION; INTRACAUDAL; PERINEURAL at 08:11

## 2022-03-28 RX ADMIN — Medication 12.5 MG: at 09:55

## 2022-03-28 RX ADMIN — SODIUM CHLORIDE, POTASSIUM CHLORIDE, SODIUM LACTATE AND CALCIUM CHLORIDE 25 ML/HR: 600; 310; 30; 20 INJECTION, SOLUTION INTRAVENOUS at 07:44

## 2022-03-28 RX ADMIN — GLYCOPYRROLATE 0.2 MG: 0.2 INJECTION, SOLUTION INTRAMUSCULAR; INTRAVENOUS at 08:06

## 2022-03-28 RX ADMIN — FENTANYL CITRATE 25 MCG: 50 INJECTION, SOLUTION INTRAMUSCULAR; INTRAVENOUS at 09:00

## 2022-03-28 RX ADMIN — PROPOFOL 150 MG: 10 INJECTION, EMULSION INTRAVENOUS at 08:11

## 2022-03-28 RX ADMIN — KETOROLAC TROMETHAMINE 30 MG: 30 INJECTION, SOLUTION INTRAMUSCULAR at 08:17

## 2022-03-28 RX ADMIN — FENTANYL CITRATE 25 MCG: 50 INJECTION, SOLUTION INTRAMUSCULAR; INTRAVENOUS at 08:42

## 2022-03-28 RX ADMIN — DEXAMETHASONE SODIUM PHOSPHATE 4 MG: 4 INJECTION, SOLUTION INTRAMUSCULAR; INTRAVENOUS at 08:17

## 2022-03-28 RX ADMIN — FENTANYL CITRATE 50 MCG: 50 INJECTION, SOLUTION INTRAMUSCULAR; INTRAVENOUS at 08:11

## 2022-03-28 NOTE — BRIEF OP NOTE
Brief Postoperative Note    Patient: Max Ponce  YOB: 1960  MRN: [id number]    Date of Procedure: 3/28/2022     Pre-Op Diagnosis: POSTMENOPAUSAL BLEEDING/ENDOMETRIAL POLYP    Post-Op Diagnosis: Same as preoperative diagnosis. Procedure(s):   HYSTEROSCOPY WITH MYOSURE POLYPECTOMY/DILATATION AND CURETTAGE (CHOICE)    Surgeon(s):  Yolis Hensley MD    Surgical Assistant: None    Anesthesia: Other     Estimated Blood Loss (mL): minimal    Hysteroscopic fluid deficit: 575 mL    Complications: None    Specimens:   ID Type Source Tests Collected by Time Destination   1 : Endocervical curettings Preservative Cervical  Yolis Hensley MD 3/28/2022 0900 Pathology        Implants: * No implants in log *    Drains: * No LDAs found *    Findings: anterior lower uterine endometrial polyp, mild internal os stenosis    Electronically Signed by Kuldip Del Cid MD on 3/28/2022 at 9:19 AM    Dictation confirmation: [id number]

## 2022-03-28 NOTE — PROGRESS NOTES
Patient does not complain of nausea. Patient does not complain of vertigo. Patient does complain of pain. Pain is located in Right upper quadrant, Right lower quadrant, Left upper quadrant, Left lower quadrant with a pain level of 1. (Cramping pain)    Patient meets discharge criteria for safe discharge to Home. Family member providing ride was provided with the following instructions verbally as well as a paper copy. Patient and caregiver told that large amounts of bleeding, loss of consciousness, or difficulty breathing should be considered an emergency and they should call 911 or return to the emergency department. Signs and symptoms of infection were explained along with when to call Isamar Dixon MD's office. Expected minor symptoms, self care, infection prevention, medications, and dietary considerations were all addressed with no further questions. Patient assisted getting dressed; IV removed, and drink was offered. Patient was brought out to their ride via wheelchair. Paper discharge instructions provided.

## 2022-03-28 NOTE — ANESTHESIA POSTPROCEDURE EVALUATION
Post-Anesthesia Evaluation and Assessment    Cardiovascular Function/Vital Signs  Visit Vitals  BP (!) 147/78   Pulse 99   Temp 36.4 °C (97.5 °F)   Resp 16   Ht 5' 3\" (1.6 m)   Wt 82.6 kg (182 lb)   SpO2 95%   BMI 32.24 kg/m²       Patient is status post Procedure(s): HYSTEROSCOPY WITH MYOSURE POLYPECTOMY/DILATATION AND CURETTAGE (CHOICE). Nausea/Vomiting: Controlled. Postoperative hydration reviewed and adequate. Pain:  Pain Scale 1: Numeric (0 - 10) (03/28/22 1010)  Pain Intensity 1: 0 (03/28/22 1010)   Managed. Neurological Status:   Neuro (WDL): Within Defined Limits (03/28/22 1010)   At baseline. Mental Status and Level of Consciousness: Arousable. Pulmonary Status:   O2 Device: None (Room air) (03/28/22 1010)   Adequate oxygenation and airway patent. Complications related to anesthesia: None    Post-anesthesia assessment completed. No concerns. Patient has met all discharge requirements.     Signed By: Wade Banegas MD    March 28, 2022

## 2022-03-28 NOTE — OP NOTES
[facility]  OPERATIVE REPORT    Name:  Angel Rodriguez  MR#:  [id number]  :  1960  ACCOUNT #:  [de-identified]  DATE OF SERVICE:  2022    PREOPERATIVE DIAGNOSES:  Postmenopausal bleeding, endometrial polyp. POSTOPERATIVE DIAGNOSES:  Postmenopausal bleeding, endometrial polyp. PROCEDURE PERFORMED:  Hysteroscopy with MyoSure polypectomy and dilation and curettage. SURGEON:  Berhane Isaac MD    ASSISTANT:  none    ANESTHESIA:  General/LMA. COMPLICATIONS:  None. CONSULTS:  None. SPECIMENS REMOVED:  1.  Endometrial curettings. 2.  Endocervical curettings. 3.  MyoSure endometrial curettings. IMPLANTS:  None. DRAINS:  None. ESTIMATED BLOOD LOSS:  Minimal.    HYSTEROSCOPIC FLUID DEFICIT:  200 mL. FINDINGS:  Anterior lower uterine segment endometrial polyp, mild internal os stenosis. SURGICAL TALK:  At the office at our preop visit, we discussed the risks of surgery including infection, bleeding, damage to bowel, bladder, and adjacent organs secondary to uterine perforation. We discussed the rare risk of death. We discussed risk of surgery during COVID. We discussed alternatives. We discussed the limitations and not all tissue can be sampled when doing the procedure. Alternatives were discussed, questions were answered, and permit was signed. PROCEDURE:  The patient was taken to the operating room and placed on the operating table. General anesthesia was administered. Oropharyngeal airway placed. Carefully, she was put in dorsal lithotomy position utilizing the Yellofin leg holders where she was prepped with Betadine vaginally, vulvar, and perineally and draped in normal fashion for this procedure. Time-out was performed. No concerns or questions were noted. Posterior weighted speculum was placed. Anterior retractor placed. Cervix was grasped with a single-tooth tenaculum.   Cervix was dilated to a 6 Hegar dilator easily, however, beyond this was some difficulty what we could not dilate despite trying to use the Brain Oven for manual dilation. Hysteroscope was inserted through the os and a polyp was protruding through the cervical os, tried to get past it, we could never get through to get good visualization of endometrial cavity, so utilizing the MyoSure, we removed the polyp and entered the endometrial cavity, however, never had very good visualization of the cavity due to what I think more mucusy secretions within the cavity. Curettage was then performed with a #1 sharp curette and then we re-looked with the scope, and at this time, we were able to see both fallopian tube os and cavity very easily. Utilizing the MyoSure, the walls were sampled and scope was removed. At this point, ECC was performed and the procedure was ended. All tenaculums and retractors were removed. Anesthesia reversed, taken out of dorsal lithotomy position after she had been cleansed and undraped, and then taken to the PACU in stable condition. Anesthesia noted that her lower incisors were quite loose contrary to what the patient told him, no teeth fell out or appeared to be harmed though they were tenuous.       MD MENDEZ [email or web address]/[email or web address]  D:  03/28/2022 9:30  T:  03/28/2022 12:39  JOB #:  [id number]  CC:  Omar Christensen MD

## 2022-03-28 NOTE — PROGRESS NOTES
I saw patient prior to procedure today. She is doing well without complaint, no change in PMH, PSH, Allergies, Medications, ROS or exam.  She is in agreement with planned procedure today. Questions answered. The patient was counseled at length about the risks of lyndsey Covid-19 during their perioperative period and any recovery window from their procedure. The patient was made aware that lyndsey Covid-19  may worsen their prognosis for recovering from their procedure and lend to a higher morbidity and/or mortality risk. All material risks, benefits, and reasonable alternatives including postponing the procedure were discussed. The patient does  wish to proceed with the procedure at this time.

## 2022-08-30 ENCOUNTER — TRANSCRIBE ORDER (OUTPATIENT)
Dept: SCHEDULING | Age: 62
End: 2022-08-30

## 2022-08-30 DIAGNOSIS — Z12.31 VISIT FOR SCREENING MAMMOGRAM: Primary | ICD-10-CM

## 2022-10-17 ENCOUNTER — HOSPITAL ENCOUNTER (OUTPATIENT)
Dept: MAMMOGRAPHY | Age: 62
Discharge: HOME | End: 2022-10-17
Attending: INTERNAL MEDICINE
Payer: COMMERCIAL

## 2022-10-17 VITALS — BODY MASS INDEX: 32.24 KG/M2 | WEIGHT: 182 LBS

## 2022-10-17 DIAGNOSIS — Z12.31 VISIT FOR SCREENING MAMMOGRAM: ICD-10-CM

## 2022-10-17 PROCEDURE — 77067 SCR MAMMO BI INCL CAD: CPT

## 2023-09-27 ENCOUNTER — TRANSCRIBE ORDERS (OUTPATIENT)
Facility: HOSPITAL | Age: 63
End: 2023-09-27

## 2023-09-27 DIAGNOSIS — Z12.31 SCREENING MAMMOGRAM FOR BREAST CANCER: Primary | ICD-10-CM

## 2023-10-24 ENCOUNTER — HOSPITAL ENCOUNTER (OUTPATIENT)
Facility: HOSPITAL | Age: 63
Discharge: HOME | End: 2023-10-27
Payer: COMMERCIAL

## 2023-10-24 VITALS — WEIGHT: 182 LBS | BODY MASS INDEX: 32.24 KG/M2

## 2023-10-24 DIAGNOSIS — Z12.31 SCREENING MAMMOGRAM FOR BREAST CANCER: ICD-10-CM

## 2023-10-24 PROCEDURE — 77063 BREAST TOMOSYNTHESIS BI: CPT

## 2024-01-05 ENCOUNTER — HOSPITAL ENCOUNTER (EMERGENCY)
Facility: HOSPITAL | Age: 64
Discharge: HOME | End: 2024-01-05
Attending: EMERGENCY MEDICINE
Payer: COMMERCIAL

## 2024-01-05 VITALS
OXYGEN SATURATION: 97 % | HEART RATE: 95 BPM | RESPIRATION RATE: 16 BRPM | SYSTOLIC BLOOD PRESSURE: 151 MMHG | BODY MASS INDEX: 28.34 KG/M2 | TEMPERATURE: 98.2 F | DIASTOLIC BLOOD PRESSURE: 86 MMHG | WEIGHT: 160 LBS

## 2024-01-05 DIAGNOSIS — S61.412A LACERATION OF LEFT HAND WITHOUT FOREIGN BODY, INITIAL ENCOUNTER: Primary | ICD-10-CM

## 2024-01-05 PROCEDURE — 99282 EMERGENCY DEPT VISIT SF MDM: CPT

## 2024-01-05 PROCEDURE — 12001 RPR S/N/AX/GEN/TRNK 2.5CM/<: CPT

## 2024-01-05 RX ORDER — BACITRACIN ZINC 500 [USP'U]/G
OINTMENT TOPICAL ONCE
Status: DISCONTINUED | OUTPATIENT
Start: 2024-01-05 | End: 2024-01-05 | Stop reason: HOSPADM

## 2024-01-05 ASSESSMENT — LIFESTYLE VARIABLES: HOW OFTEN DO YOU HAVE A DRINK CONTAINING ALCOHOL: NEVER

## 2024-01-06 NOTE — ED PROVIDER NOTES
previous visit (from the past 24 hour(s)).       RADIOLOGY:  Non-plain film images such as CT, Ultrasound and MRI are read by the radiologist. Plain radiographic images are visualized and preliminarily interpreted by the ED Provider with the below findings:        Interpretation per the Radiologist below, if available at the time of this note:     No orders to display           PROCEDURES   Unless otherwise noted below, none  Lac Repair    Date/Time: 1/5/2024 7:22 PM    Performed by: Shantelle Rust MD  Authorized by: Shantelle Rust MD    Consent:     Consent obtained:  Verbal    Risks discussed:  Pain, infection, need for additional repair and vascular damage  Laceration details:     Location:  Hand    Hand location:  L palm    Length (cm):  1  Treatment:     Area cleansed with:  Saline    Amount of cleaning:  Standard    Irrigation solution:  Sterile saline    Irrigation method:  Pressure wash  Skin repair:     Repair method:  Sutures    Suture size:  5-0    Suture material:  Chromic gut    Suture technique:  Simple interrupted    Number of sutures:  1  Approximation:     Approximation:  Close  Repair type:     Repair type:  Simple  Post-procedure details:     Dressing:  Antibiotic ointment and bulky dressing    Procedure completion:  Tolerated            EMERGENCY DEPARTMENT COURSE and DIFFERENTIAL DIAGNOSIS/MDM   Vitals:    Vitals:    01/05/24 1517 01/05/24 1524 01/05/24 1525   BP:  (!) 151/86    Pulse:  95    Resp:  16    Temp:  98.2 °F (36.8 °C)    TempSrc: Oral Oral    SpO2:  97%    Weight:   72.6 kg (160 lb)            Patient was given the following medications:  Medications - No data to display    CONSULTS: (Who and What was discussed)  None      Social Determinants affecting Dx or Tx: None    Records Reviewed (source and summary of external notes): Nursing Notes and Old Medical Records    CC/HPI Summary, DDx, ED Course, and Reassessment: 63-year-old female with laceration of hand.  Wound sutured

## 2024-01-21 ENCOUNTER — HOSPITAL ENCOUNTER (INPATIENT)
Facility: HOSPITAL | Age: 64
LOS: 1 days | Discharge: HOME | DRG: 817 | End: 2024-01-23
Attending: EMERGENCY MEDICINE | Admitting: INTERNAL MEDICINE
Payer: COMMERCIAL

## 2024-01-21 DIAGNOSIS — T39.1X2A TYLENOL OVERDOSE, INTENTIONAL SELF-HARM, INITIAL ENCOUNTER (HCC): Primary | ICD-10-CM

## 2024-01-21 LAB
ALBUMIN SERPL-MCNC: 4.4 G/DL (ref 3.5–5)
ALBUMIN/GLOB SERPL: 1 (ref 1.1–2.2)
ALP SERPL-CCNC: 81 U/L (ref 45–117)
ALT SERPL-CCNC: 39 U/L (ref 12–78)
AMPHET UR QL SCN: NEGATIVE
ANION GAP SERPL CALC-SCNC: 10 MMOL/L (ref 5–15)
APAP SERPL-MCNC: 228 UG/ML (ref 10–30)
APAP SERPL-MCNC: 230 UG/ML (ref 10–30)
APTT PPP: 23.5 SEC (ref 22.1–31)
AST SERPL-CCNC: 46 U/L (ref 15–37)
BARBITURATES UR QL SCN: NEGATIVE
BASOPHILS # BLD: 0 K/UL (ref 0–0.1)
BASOPHILS NFR BLD: 0 % (ref 0–1)
BENZODIAZ UR QL: NEGATIVE
BILIRUB SERPL-MCNC: 0.3 MG/DL (ref 0.2–1)
BUN SERPL-MCNC: 12 MG/DL (ref 6–20)
BUN/CREAT SERPL: 16 (ref 12–20)
CALCIUM SERPL-MCNC: 9.7 MG/DL (ref 8.5–10.1)
CANNABINOIDS UR QL SCN: NEGATIVE
CHLORIDE SERPL-SCNC: 99 MMOL/L (ref 97–108)
CO2 SERPL-SCNC: 30 MMOL/L (ref 21–32)
COCAINE UR QL SCN: NEGATIVE
CREAT SERPL-MCNC: 0.74 MG/DL (ref 0.55–1.02)
DIFFERENTIAL METHOD BLD: ABNORMAL
EOSINOPHIL # BLD: 0.1 K/UL (ref 0–0.4)
EOSINOPHIL NFR BLD: 1 % (ref 0–7)
ERYTHROCYTE [DISTWIDTH] IN BLOOD BY AUTOMATED COUNT: 14.7 % (ref 11.5–14.5)
ETHANOL SERPL-MCNC: 225 MG/DL (ref 0–0.08)
GLOBULIN SER CALC-MCNC: 4.6 G/DL (ref 2–4)
GLUCOSE SERPL-MCNC: 123 MG/DL (ref 65–100)
HCT VFR BLD AUTO: 37.5 % (ref 35–47)
HGB BLD-MCNC: 12.8 G/DL (ref 11.5–16)
IMM GRANULOCYTES # BLD AUTO: 0 K/UL (ref 0–0.04)
IMM GRANULOCYTES NFR BLD AUTO: 0 % (ref 0–0.5)
INR PPP: 1 (ref 0.9–1.1)
LYMPHOCYTES # BLD: 9.5 K/UL (ref 0.8–3.5)
LYMPHOCYTES NFR BLD: 63 % (ref 12–49)
Lab: NORMAL
MCH RBC QN AUTO: 32.6 PG (ref 26–34)
MCHC RBC AUTO-ENTMCNC: 34.1 G/DL (ref 30–36.5)
MCV RBC AUTO: 95.4 FL (ref 80–99)
METHADONE UR QL: NEGATIVE
MONOCYTES # BLD: 1 K/UL (ref 0–1)
MONOCYTES NFR BLD: 7 % (ref 5–13)
NEUTS SEG # BLD: 4.3 K/UL (ref 1.8–8)
NEUTS SEG NFR BLD: 29 % (ref 32–75)
NRBC # BLD: 0 K/UL (ref 0–0.01)
NRBC BLD-RTO: 0 PER 100 WBC
OPIATES UR QL: NEGATIVE
PCP UR QL: NEGATIVE
PLATELET # BLD AUTO: 320 K/UL (ref 150–400)
PMV BLD AUTO: 9.9 FL (ref 8.9–12.9)
POTASSIUM SERPL-SCNC: 4.3 MMOL/L (ref 3.5–5.1)
PROT SERPL-MCNC: 9 G/DL (ref 6.4–8.2)
PROTHROMBIN TIME: 10.3 SEC (ref 9–11.1)
RBC # BLD AUTO: 3.93 M/UL (ref 3.8–5.2)
RBC MORPH BLD: ABNORMAL
SALICYLATES SERPL-MCNC: 2.7 MG/DL (ref 2.8–20)
SODIUM SERPL-SCNC: 139 MMOL/L (ref 136–145)
THERAPEUTIC RANGE: NORMAL SECS (ref 58–77)
WBC # BLD AUTO: 14.9 K/UL (ref 3.6–11)

## 2024-01-21 PROCEDURE — 96366 THER/PROPH/DIAG IV INF ADDON: CPT

## 2024-01-21 PROCEDURE — 80179 DRUG ASSAY SALICYLATE: CPT

## 2024-01-21 PROCEDURE — 80143 DRUG ASSAY ACETAMINOPHEN: CPT

## 2024-01-21 PROCEDURE — 85730 THROMBOPLASTIN TIME PARTIAL: CPT

## 2024-01-21 PROCEDURE — 80053 COMPREHEN METABOLIC PANEL: CPT

## 2024-01-21 PROCEDURE — 36415 COLL VENOUS BLD VENIPUNCTURE: CPT

## 2024-01-21 PROCEDURE — 93005 ELECTROCARDIOGRAM TRACING: CPT | Performed by: EMERGENCY MEDICINE

## 2024-01-21 PROCEDURE — 85610 PROTHROMBIN TIME: CPT

## 2024-01-21 PROCEDURE — 96365 THER/PROPH/DIAG IV INF INIT: CPT

## 2024-01-21 PROCEDURE — 2580000003 HC RX 258: Performed by: EMERGENCY MEDICINE

## 2024-01-21 PROCEDURE — 6360000002 HC RX W HCPCS: Performed by: EMERGENCY MEDICINE

## 2024-01-21 PROCEDURE — 82077 ASSAY SPEC XCP UR&BREATH IA: CPT

## 2024-01-21 PROCEDURE — 80307 DRUG TEST PRSMV CHEM ANLYZR: CPT

## 2024-01-21 PROCEDURE — 85025 COMPLETE CBC W/AUTO DIFF WBC: CPT

## 2024-01-21 PROCEDURE — 96375 TX/PRO/DX INJ NEW DRUG ADDON: CPT

## 2024-01-21 PROCEDURE — 99285 EMERGENCY DEPT VISIT HI MDM: CPT

## 2024-01-21 RX ORDER — ONDANSETRON 2 MG/ML
4 INJECTION INTRAMUSCULAR; INTRAVENOUS
Status: COMPLETED | OUTPATIENT
Start: 2024-01-21 | End: 2024-01-21

## 2024-01-21 RX ADMIN — ACETYLCYSTEINE 10900 MG: 200 INJECTION, SOLUTION INTRAVENOUS at 22:22

## 2024-01-21 RX ADMIN — ONDANSETRON 4 MG: 2 INJECTION INTRAMUSCULAR; INTRAVENOUS at 23:03

## 2024-01-21 ASSESSMENT — LIFESTYLE VARIABLES
HOW OFTEN DO YOU HAVE A DRINK CONTAINING ALCOHOL: 4 OR MORE TIMES A WEEK
HOW MANY STANDARD DRINKS CONTAINING ALCOHOL DO YOU HAVE ON A TYPICAL DAY: 3 OR 4

## 2024-01-21 ASSESSMENT — PAIN SCALES - GENERAL: PAINLEVEL_OUTOF10: 6

## 2024-01-21 ASSESSMENT — PAIN DESCRIPTION - LOCATION: LOCATION: HEAD

## 2024-01-22 PROBLEM — T39.1X2A TYLENOL OVERDOSE, INTENTIONAL SELF-HARM, INITIAL ENCOUNTER (HCC): Status: ACTIVE | Noted: 2024-01-22

## 2024-01-22 LAB
ALBUMIN SERPL-MCNC: 3.4 G/DL (ref 3.5–5)
ALBUMIN/GLOB SERPL: 0.9 (ref 1.1–2.2)
ALP SERPL-CCNC: 59 U/L (ref 45–117)
ALT SERPL-CCNC: 28 U/L (ref 12–78)
ALT SERPL-CCNC: 29 U/L (ref 12–78)
ALT SERPL-CCNC: 30 U/L (ref 12–78)
ANION GAP SERPL CALC-SCNC: 11 MMOL/L (ref 5–15)
APAP SERPL-MCNC: 105 UG/ML (ref 10–30)
APAP SERPL-MCNC: 4 UG/ML (ref 10–30)
APAP SERPL-MCNC: <2 UG/ML (ref 10–30)
AST SERPL-CCNC: 25 U/L (ref 15–37)
BASOPHILS # BLD: 0 K/UL (ref 0–0.1)
BASOPHILS NFR BLD: 0 % (ref 0–1)
BILIRUB DIRECT SERPL-MCNC: 0.1 MG/DL (ref 0–0.2)
BILIRUB SERPL-MCNC: 0.3 MG/DL (ref 0.2–1)
BUN SERPL-MCNC: 8 MG/DL (ref 6–20)
BUN/CREAT SERPL: 9 (ref 12–20)
CALCIUM SERPL-MCNC: 9.1 MG/DL (ref 8.5–10.1)
CHLORIDE SERPL-SCNC: 100 MMOL/L (ref 97–108)
CO2 SERPL-SCNC: 29 MMOL/L (ref 21–32)
CREAT SERPL-MCNC: 0.89 MG/DL (ref 0.55–1.02)
DIFFERENTIAL METHOD BLD: ABNORMAL
EOSINOPHIL # BLD: 0 K/UL (ref 0–0.4)
EOSINOPHIL NFR BLD: 0 % (ref 0–7)
ERYTHROCYTE [DISTWIDTH] IN BLOOD BY AUTOMATED COUNT: 14.7 % (ref 11.5–14.5)
GLOBULIN SER CALC-MCNC: 3.7 G/DL (ref 2–4)
GLUCOSE SERPL-MCNC: 157 MG/DL (ref 65–100)
HCT VFR BLD AUTO: 32.7 % (ref 35–47)
HGB BLD-MCNC: 11.1 G/DL (ref 11.5–16)
IMM GRANULOCYTES # BLD AUTO: 0.1 K/UL (ref 0–0.04)
IMM GRANULOCYTES NFR BLD AUTO: 0 % (ref 0–0.5)
INR PPP: 1.1 (ref 0.9–1.1)
LYMPHOCYTES # BLD: 2.7 K/UL (ref 0.8–3.5)
LYMPHOCYTES NFR BLD: 21 % (ref 12–49)
MAGNESIUM SERPL-MCNC: 1.7 MG/DL (ref 1.6–2.4)
MCH RBC QN AUTO: 31.9 PG (ref 26–34)
MCHC RBC AUTO-ENTMCNC: 33.9 G/DL (ref 30–36.5)
MCV RBC AUTO: 94 FL (ref 80–99)
MONOCYTES # BLD: 0.9 K/UL (ref 0–1)
MONOCYTES NFR BLD: 7 % (ref 5–13)
NEUTS SEG # BLD: 9.2 K/UL (ref 1.8–8)
NEUTS SEG NFR BLD: 72 % (ref 32–75)
NRBC # BLD: 0 K/UL (ref 0–0.01)
NRBC BLD-RTO: 0 PER 100 WBC
PLATELET # BLD AUTO: 280 K/UL (ref 150–400)
PMV BLD AUTO: 9.9 FL (ref 8.9–12.9)
POTASSIUM SERPL-SCNC: 3.3 MMOL/L (ref 3.5–5.1)
PROT SERPL-MCNC: 7.1 G/DL (ref 6.4–8.2)
PROTHROMBIN TIME: 10.6 SEC (ref 9–11.1)
RBC # BLD AUTO: 3.48 M/UL (ref 3.8–5.2)
SODIUM SERPL-SCNC: 140 MMOL/L (ref 136–145)
TSH SERPL DL<=0.05 MIU/L-ACNC: 1.4 UIU/ML (ref 0.36–3.74)
WBC # BLD AUTO: 12.9 K/UL (ref 3.6–11)

## 2024-01-22 PROCEDURE — 80076 HEPATIC FUNCTION PANEL: CPT

## 2024-01-22 PROCEDURE — 99222 1ST HOSP IP/OBS MODERATE 55: CPT | Performed by: PSYCHIATRY & NEUROLOGY

## 2024-01-22 PROCEDURE — 85025 COMPLETE CBC W/AUTO DIFF WBC: CPT

## 2024-01-22 PROCEDURE — 2580000003 HC RX 258: Performed by: INTERNAL MEDICINE

## 2024-01-22 PROCEDURE — 83735 ASSAY OF MAGNESIUM: CPT

## 2024-01-22 PROCEDURE — 2580000003 HC RX 258: Performed by: EMERGENCY MEDICINE

## 2024-01-22 PROCEDURE — 85610 PROTHROMBIN TIME: CPT

## 2024-01-22 PROCEDURE — 6370000000 HC RX 637 (ALT 250 FOR IP): Performed by: PSYCHIATRY & NEUROLOGY

## 2024-01-22 PROCEDURE — 1100000003 HC PRIVATE W/ TELEMETRY

## 2024-01-22 PROCEDURE — 96376 TX/PRO/DX INJ SAME DRUG ADON: CPT

## 2024-01-22 PROCEDURE — 6360000002 HC RX W HCPCS: Performed by: EMERGENCY MEDICINE

## 2024-01-22 PROCEDURE — 36415 COLL VENOUS BLD VENIPUNCTURE: CPT

## 2024-01-22 PROCEDURE — 80143 DRUG ASSAY ACETAMINOPHEN: CPT

## 2024-01-22 PROCEDURE — 6360000002 HC RX W HCPCS: Performed by: INTERNAL MEDICINE

## 2024-01-22 PROCEDURE — 84443 ASSAY THYROID STIM HORMONE: CPT

## 2024-01-22 PROCEDURE — 80053 COMPREHEN METABOLIC PANEL: CPT

## 2024-01-22 PROCEDURE — 94760 N-INVAS EAR/PLS OXIMETRY 1: CPT

## 2024-01-22 PROCEDURE — 84460 ALANINE AMINO (ALT) (SGPT): CPT

## 2024-01-22 PROCEDURE — 6370000000 HC RX 637 (ALT 250 FOR IP): Performed by: INTERNAL MEDICINE

## 2024-01-22 RX ORDER — OXCARBAZEPINE 300 MG/1
300 TABLET, FILM COATED ORAL 2 TIMES DAILY
Status: DISCONTINUED | OUTPATIENT
Start: 2024-01-22 | End: 2024-01-23 | Stop reason: HOSPADM

## 2024-01-22 RX ORDER — FOLIC ACID 1 MG/1
1 TABLET ORAL DAILY
COMMUNITY
Start: 2023-11-14 | End: 2024-11-13

## 2024-01-22 RX ORDER — ACETYLCYSTEINE 200 MG/ML
600 SOLUTION ORAL; RESPIRATORY (INHALATION)
Status: DISCONTINUED | OUTPATIENT
Start: 2024-01-22 | End: 2024-01-22 | Stop reason: CLARIF

## 2024-01-22 RX ORDER — POTASSIUM CHLORIDE 750 MG/1
40 TABLET, FILM COATED, EXTENDED RELEASE ORAL PRN
Status: DISCONTINUED | OUTPATIENT
Start: 2024-01-22 | End: 2024-01-22

## 2024-01-22 RX ORDER — OXCARBAZEPINE 150 MG/1
150 TABLET, FILM COATED ORAL
Status: COMPLETED | OUTPATIENT
Start: 2024-01-22 | End: 2024-01-22

## 2024-01-22 RX ORDER — POTASSIUM CHLORIDE 7.45 MG/ML
10 INJECTION INTRAVENOUS PRN
Status: DISCONTINUED | OUTPATIENT
Start: 2024-01-22 | End: 2024-01-22

## 2024-01-22 RX ORDER — SODIUM CHLORIDE 0.9 % (FLUSH) 0.9 %
5-40 SYRINGE (ML) INJECTION PRN
Status: DISCONTINUED | OUTPATIENT
Start: 2024-01-22 | End: 2024-01-23 | Stop reason: HOSPADM

## 2024-01-22 RX ORDER — SODIUM CHLORIDE 0.9 % (FLUSH) 0.9 %
5-40 SYRINGE (ML) INJECTION EVERY 12 HOURS SCHEDULED
Status: DISCONTINUED | OUTPATIENT
Start: 2024-01-22 | End: 2024-01-23 | Stop reason: HOSPADM

## 2024-01-22 RX ORDER — MAGNESIUM SULFATE IN WATER 40 MG/ML
2000 INJECTION, SOLUTION INTRAVENOUS PRN
Status: DISCONTINUED | OUTPATIENT
Start: 2024-01-22 | End: 2024-01-22

## 2024-01-22 RX ORDER — ONDANSETRON 4 MG/1
4 TABLET, ORALLY DISINTEGRATING ORAL EVERY 8 HOURS PRN
Status: DISCONTINUED | OUTPATIENT
Start: 2024-01-22 | End: 2024-01-23 | Stop reason: HOSPADM

## 2024-01-22 RX ORDER — ONDANSETRON 2 MG/ML
4 INJECTION INTRAMUSCULAR; INTRAVENOUS EVERY 6 HOURS PRN
Status: DISCONTINUED | OUTPATIENT
Start: 2024-01-22 | End: 2024-01-23 | Stop reason: HOSPADM

## 2024-01-22 RX ORDER — SODIUM CHLORIDE 9 MG/ML
INJECTION, SOLUTION INTRAVENOUS PRN
Status: DISCONTINUED | OUTPATIENT
Start: 2024-01-22 | End: 2024-01-23 | Stop reason: HOSPADM

## 2024-01-22 RX ORDER — POTASSIUM CHLORIDE 750 MG/1
20 TABLET, FILM COATED, EXTENDED RELEASE ORAL DAILY
Status: DISCONTINUED | OUTPATIENT
Start: 2024-01-22 | End: 2024-01-23 | Stop reason: HOSPADM

## 2024-01-22 RX ORDER — SODIUM CHLORIDE 9 MG/ML
125 INJECTION, SOLUTION INTRAVENOUS ONCE
Status: DISCONTINUED | OUTPATIENT
Start: 2024-01-22 | End: 2024-01-23 | Stop reason: HOSPADM

## 2024-01-22 RX ORDER — POLYETHYLENE GLYCOL 3350 17 G/17G
17 POWDER, FOR SOLUTION ORAL DAILY PRN
Status: DISCONTINUED | OUTPATIENT
Start: 2024-01-22 | End: 2024-01-23 | Stop reason: HOSPADM

## 2024-01-22 RX ADMIN — ACETYLCYSTEINE 7260 MG: 200 INJECTION, SOLUTION INTRAVENOUS at 07:25

## 2024-01-22 RX ADMIN — SODIUM CHLORIDE, PRESERVATIVE FREE 10 ML: 5 INJECTION INTRAVENOUS at 22:14

## 2024-01-22 RX ADMIN — ACETYLCYSTEINE 3640 MG: 200 INJECTION, SOLUTION INTRAVENOUS at 00:43

## 2024-01-22 RX ADMIN — OXCARBAZEPINE 300 MG: 300 TABLET, FILM COATED ORAL at 22:14

## 2024-01-22 RX ADMIN — OXCARBAZEPINE 150 MG: 150 TABLET, FILM COATED ORAL at 14:13

## 2024-01-22 RX ADMIN — POTASSIUM CHLORIDE 20 MEQ: 750 TABLET, FILM COATED, EXTENDED RELEASE ORAL at 18:48

## 2024-01-22 ASSESSMENT — PAIN SCALES - GENERAL
PAINLEVEL_OUTOF10: 0
PAINLEVEL_OUTOF10: 3

## 2024-01-22 ASSESSMENT — PAIN DESCRIPTION - LOCATION: LOCATION: HEAD;THROAT

## 2024-01-22 NOTE — CARE COORDINATION
Care Management Initial Assessment       RUR: 9%  Readmission? No  1st IM letter given? No N/A  1st  letter given: No      01/22/24 1646   Service Assessment   Patient Orientation Alert and Oriented;Person;Place;Situation;Self   Cognition Alert   History Provided By Patient   Primary Caregiver Self   Support Systems Family Members   PCP Verified by CM Yes  (Paty Stover DO)   Last Visit to PCP Within last 3 months   Prior Functional Level Independent in ADLs/IADLs   Current Functional Level Independent in ADLs/IADLs   Can patient return to prior living arrangement Yes   Family able to assist with home care needs: Yes   Would you like for me to discuss the discharge plan with any other family members/significant others, and if so, who? No   Financial Resources Medicaid   Community Resources None   Social/Functional History   Lives With Alone   Type of Home Apartment   Home Equipment None   Active  Yes   Discharge Planning   Type of Residence Apartment   Current Services Prior To Admission None   Patient expects to be discharged to: Other (comment)  (TBD)     Ms. Cummings was admitted under Inpatient status 1/21/24 with an intentional Tylenol overdose. Dr. Mckeon saw her today, and she said the visit was helpful and she is feeling better. She lives alone in an apartment in [place]. She does have a sister Tona who lives in the area, but otherwise it appears she does not have much of a support system and feels loneliness, depression. Dr. Mckeon mentions in his visit note that he will be seeing her tomorrow if she is here and be able to reassess Ms. Cummings's discharge needs re hospitalization.   The CM introductory letter with contact information was given.  For long term planning when Ms. Cummings is able, I advised that [facility] offers a senior activity group in the meeting room at [facility] a few days per week. I will provide her information for this resource, as it may be an avenue

## 2024-01-22 NOTE — ED PROVIDER NOTES
[facility] EMERGENCY DEP  EMERGENCY DEPARTMENT ENCOUNTER       Pt Name: Destiny Cummings  MRN: [id number]  Birthdate 1960  Date of evaluation: 1/21/2024  Provider: Dominique Miranda MD   PCP: Paty Stover DO  Note Started: 11:08 PM EST 1/21/24     CHIEF COMPLAINT       Chief Complaint   Patient presents with    Drug Overdose        HISTORY OF PRESENT ILLNESS: 1 or more elements      History From: Patient and EMS  HPI Limitations: None     Destiny Cummings is a 63 y.o. female with history of prediabetes, allergies who presents to the ED with reported Tylenol overdose.  Patient apparently called a friend and told her she had taken an overdose of Tylenol.  The friend then called EMS who came to patient's house and brought her to the ED.  Patient admits that she was feeling depressed and stressed out and felt like no one cared about her.  She admits that she took about 15 Tylenol 650 mg extended release tablets sometime this evening.  She thinks it was about 7 PM.  She also reports drinking \"2-3 beers.  Denies any drug use.  She denies any history of treatment for depression.  She denies history of previous suicide attempt or overdose.  She reports she did not take any other medications tonight.  She has never had any psychiatric admissions.  She admits to feeling drowsy, but does not have any other symptoms at this time.  She tells me she lives alone.     REVIEW OF SYSTEMS      Review of Systems     Positives and Pertinent negatives as per HPI.    PAST HISTORY     Past Medical History:  Past Medical History:   Diagnosis Date    Menopause          Past Surgical History:  Past Surgical History:   Procedure Laterality Date    BREAST BIOPSY Left     BENIGN EARLY 2000s       Family History:  Family History   Problem Relation Age of Onset    Breast Cancer Sister 39       Social History:  Social History     Tobacco Use    Smoking status: Never    Smokeless tobacco: Never   Substance Use Topics    Alcohol use: Yes

## 2024-01-22 NOTE — CONSULTS
[facility]  CONSULTATION    Name:  TIFFANY CASTLE  MR#:  [id number]  :  1960  ACCOUNT #:  [id number]  DATE OF SERVICE:  2024    PSYCHIATRIC CONSULTATION    ATTENDING PHYSICIAN:  Jeremy Noyola MD    REASON FOR CONSULTATION:  Evaluate for depression and suicidal ideation.    HISTORY OF PRESENT ILLNESS:  The patient is a 63-year-old single female who I am familiar with secondary to a brief hospitalization almost 2 years ago (2022).  When she was dealing with some depression, following the breakup with her partner of many years.  At that time, she was started on some Celexa, but her history was consistent with some atypical symptoms, particularly fairly regular mood cycling usually where her moods would last for two or three days in duration, both in having depressive episodes as well as some very mild almost hypomanic like episodes.  At that time, she apparently stopped taking the medicines immediately after discharge and never had any followup care as instructed, and she states that her mood was relatively \"okay\" since then until the past several weeks or more.    She had taken an overdose of roughly 15 Tylenol tablets on  around 07:00 p.m. after also drinking about three beers (by her report) during the day.  She states that she thought she would die, but then she questions that because she states that she then called a friend who called the EMS and had her brought to the emergency room.  She agrees that she was somewhat ambivalent about dying, but when asked if she wished it had been successful today, she reluctantly and somewhat hesitantly says \"no,\" but clearly indicating some ambivalence.    She denies that there is any particular triggers other than the fact that she is very lonely.  She lives alone and has no support.  She states she also lost her job at [employer] in November possibly over some type of poor performance.  She states she has been working

## 2024-01-22 NOTE — H&P
no focal neurological deficits.  Musculoskeletal: normal ROM, no joint swelling or tenderness.  Skin: Warm, dry, intact. No rashes   Psychiatric;  depressed.  No agitation or psychosis  Patient was evaluated through a synchronous (real-time) audio-video encounter using  tele-medicine cart. This virtual visit was conducted with patient's consent.  Nurse was present during the encounter.        Lab Results   Component Value Date     01/21/2024    K 4.3 01/21/2024    CL 99 01/21/2024    CO2 30 01/21/2024    BUN 12 01/21/2024    CREATININE 0.74 01/21/2024    GLUCOSE 123 (H) 01/21/2024    CALCIUM 9.7 01/21/2024    PROT 9.0 (H) 01/21/2024    LABALBU 4.4 01/21/2024    BILITOT 0.3 01/21/2024    ALKPHOS 81 01/21/2024    AST 46 (H) 01/21/2024    ALT 39 01/21/2024    LABGLOM >60 01/21/2024    GFRAA >60 03/07/2022    AGRATIO 1.0 (L) 01/21/2024    GLOB 4.6 (H) 01/21/2024           Lab Results   Component Value Date    WBC 14.9 (H) 01/21/2024    HGB 12.8 01/21/2024    HCT 37.5 01/21/2024    MCV 95.4 01/21/2024     01/21/2024     [unfilled]    Xray Result (most recent):  No results found for this or any previous visit from the past 3650 days.      CT Result (most recent):  No results found for this or any previous visit from the past 3650 days.        Labs personally reviewed: CBC, CMP,   EKG shows sinus rhythm with no acute dynamic ST-T changes    Case discussed with ED provider regarding clinical presentation, labs/investigations and possible admission.  Previous medical records reviewed.      Medical Decision Making (MDM) and Assessment and Plan     Active Hospital Problems    Diagnosis Date Noted    Tylenol overdose, intentional self-harm, initial encounter (HCC) [T39.1X2A] 01/22/2024     63-year-old female presented to emergency department with intentional Tylenol overdose.  Patient lives alone by herself ,was feeling depressed and thoughts that nobody cares about her and intentionally took 15 tablets of

## 2024-01-22 NOTE — PLAN OF CARE
Problem: Safety - Adult  Goal: Free from fall injury  1/22/2024 1429 by Elizabeth Deleon, RN  Outcome: Progressing  1/22/2024 0444 by Jackie Craig, RN  Outcome: Progressing     Problem: ABCDS Injury Assessment  Goal: Absence of physical injury  Outcome: Progressing     Problem: Self Harm/Suicidality  Goal: Will have no self-injury during hospital stay  Description: INTERVENTIONS:  1.  Ensure constant observer at bedside with Q15M safety checks  2.  Maintain a safe environment  3.  Secure patient belongings  4.  Ensure family/visitors adhere to safety recommendations  5.  Ensure safety tray has been added to patient's diet order  6.  Every shift and PRN: Re-assess suicidal risk via Frequent Screener    Outcome: Progressing

## 2024-01-22 NOTE — ED TRIAGE NOTES
Arrived with ems via stretcher. Ingested 15 650mg tylenol, denies suicidal or homicidal ideations

## 2024-01-22 NOTE — PROGRESS NOTES
0605 spoke with poison control regarding pt. Spoke about most recent lab values. Poison control recommends more labs to be drawn 2 hours prior to the completion of bag 3. Will relay this to oncoming shift. Bag will last for 16 hours. We do not have access to the third bag at this time.   0627 reordered ACETEDOTE with confirmation from Dr. Mejia.  0629 spoke with pharmacy about third bag. Pharmacy will readjust the times for medication.   0730 third bag was hung

## 2024-01-22 NOTE — SIGNIFICANT EVENT
This writer reviewed chart to determine the appropriate bed assignment for the patient's well being. Discussed with the unit charge nurse.  Pt will be assigned to bed 114.  ED staff made aware.

## 2024-01-22 NOTE — ED NOTES
Admission SBAR Note  Situation/Background: Pt arrived via EMS after a family member called stating the patient attempted to overdose on Tylenol. Pt stated she took approximately 15 650 mg Tylenol tablets around 6 or 7 pm. She also admitted to drinking alcohol. Pt stated she was just feeling sad and lonely but she denies suicidal or homicidal ideations. Pt Pt is on an acetylcysteine drip (#2) that is running at 129.6 mL/hr. Per poison control, a redraw of her acetaminophen level as well as LFT's and INR will be do TWO hours before the completion of the third and final bag. Pt is alert & oriented but slow to respond at times and is lethargic/sleeping most of the time. Pt has vomited once while here and was given zofran for that. No vomiting episodes since. Pt initially complained of a headache but does not complain of pain at this time. Pt is ambulatory.    Patient is being transferred to [facility] ([facility]), Room# 124    Patient's Chief Complaint was Tylenol overdose and is admitted for Tylenol overdose requiring acetylcysteine for a total of 21 hrs.    CODE STATUS: Full  CSSRS: 0 - No Risk    ISOLATION/PRECAUTIONS: No  ISOLATION TYPE:     Is this a behavioral health patient? No  Has wanding been completed   Are belongings secure?     Called outstanding consults:     STAT labs collected: Yes    Repeat Lactic Acid DUE?   TIME DUE:     All STAT orders are complete: Yes    The following personal items will be sent with the patient during transfer to the floor:     All valuables: clothing sweatshirt, tank top, shirt,  with patient at bedside       ASSESSMENT:    NEURO:   NIH SCORE:    WILVER SWALLOW SCREEN COMPLETE:   ORIENTATION LEVEL: ORIENTATION LEVEL: Person, Place, Time, and Situation  Cognition: following commands  follows two step commands/direction  Speech: is slowed    Is patient impulsive? No  Is patient oriented? Yes  Do they follow commands?

## 2024-01-22 NOTE — ED NOTES
Spoke with Marita from [place] poison control regarding the redraw of the patient's acetaminophen level. Marita stated it was to be redrawn 2 hours prior to the completion of the 3rd and final bag of acetylcysteine. She also stated that LFT's and an INR would need to be redrawn at that time as well.

## 2024-01-22 NOTE — PROGRESS NOTES
Juan Verduzco from Poison Control called and suggested  that the patient has a tylenol and liver enzyme lab draw two hours prior to the Mucomyst drip completion.  Will advise the hospitalist Dr Noyola and patient's primary nurse Elizabeth.

## 2024-01-22 NOTE — ED NOTES
Spoke with Diana at [facility] pharmacy about this patient's medication. Patient's second dose of acetylcysteine was not scanning and she was unable to find any reason why it would give me that error message. Diana stated it is possibly a timing issue in the MAR. Will override and give patient prescribed medication.

## 2024-01-23 VITALS
SYSTOLIC BLOOD PRESSURE: 119 MMHG | WEIGHT: 166.1 LBS | TEMPERATURE: 98.6 F | HEART RATE: 102 BPM | BODY MASS INDEX: 29.43 KG/M2 | HEIGHT: 63 IN | DIASTOLIC BLOOD PRESSURE: 64 MMHG | OXYGEN SATURATION: 99 % | RESPIRATION RATE: 18 BRPM

## 2024-01-23 PROBLEM — E74.39 GLUCOSE INTOLERANCE: Status: ACTIVE | Noted: 2024-01-23

## 2024-01-23 LAB
ALT SERPL-CCNC: 22 U/L (ref 12–78)
ANION GAP SERPL CALC-SCNC: 7 MMOL/L (ref 5–15)
BUN SERPL-MCNC: 7 MG/DL (ref 6–20)
BUN/CREAT SERPL: 10 (ref 12–20)
CALCIUM SERPL-MCNC: 9.1 MG/DL (ref 8.5–10.1)
CHLORIDE SERPL-SCNC: 103 MMOL/L (ref 97–108)
CO2 SERPL-SCNC: 32 MMOL/L (ref 21–32)
CREAT SERPL-MCNC: 0.72 MG/DL (ref 0.55–1.02)
EST. AVERAGE GLUCOSE BLD GHB EST-MCNC: 111 MG/DL
GLUCOSE SERPL-MCNC: 103 MG/DL (ref 65–100)
HBA1C MFR BLD: 5.5 % (ref 4–5.6)
MAGNESIUM SERPL-MCNC: 2.1 MG/DL (ref 1.6–2.4)
POTASSIUM SERPL-SCNC: 3.5 MMOL/L (ref 3.5–5.1)
SODIUM SERPL-SCNC: 142 MMOL/L (ref 136–145)

## 2024-01-23 PROCEDURE — 6370000000 HC RX 637 (ALT 250 FOR IP): Performed by: INTERNAL MEDICINE

## 2024-01-23 PROCEDURE — 6370000000 HC RX 637 (ALT 250 FOR IP): Performed by: PSYCHIATRY & NEUROLOGY

## 2024-01-23 PROCEDURE — 83036 HEMOGLOBIN GLYCOSYLATED A1C: CPT

## 2024-01-23 PROCEDURE — 2580000003 HC RX 258: Performed by: INTERNAL MEDICINE

## 2024-01-23 PROCEDURE — 99232 SBSQ HOSP IP/OBS MODERATE 35: CPT | Performed by: PSYCHIATRY & NEUROLOGY

## 2024-01-23 PROCEDURE — 80048 BASIC METABOLIC PNL TOTAL CA: CPT

## 2024-01-23 PROCEDURE — 83735 ASSAY OF MAGNESIUM: CPT

## 2024-01-23 PROCEDURE — 94760 N-INVAS EAR/PLS OXIMETRY 1: CPT

## 2024-01-23 PROCEDURE — 84460 ALANINE AMINO (ALT) (SGPT): CPT

## 2024-01-23 PROCEDURE — 36415 COLL VENOUS BLD VENIPUNCTURE: CPT

## 2024-01-23 RX ORDER — OXCARBAZEPINE 300 MG/1
300 TABLET, FILM COATED ORAL 2 TIMES DAILY
Qty: 60 TABLET | Refills: 1 | Status: SHIPPED | OUTPATIENT
Start: 2024-01-23

## 2024-01-23 RX ADMIN — POTASSIUM CHLORIDE 20 MEQ: 750 TABLET, FILM COATED, EXTENDED RELEASE ORAL at 11:17

## 2024-01-23 RX ADMIN — SODIUM CHLORIDE, PRESERVATIVE FREE 10 ML: 5 INJECTION INTRAVENOUS at 11:18

## 2024-01-23 RX ADMIN — OXCARBAZEPINE 300 MG: 300 TABLET, FILM COATED ORAL at 11:17

## 2024-01-23 ASSESSMENT — PAIN SCALES - GENERAL: PAINLEVEL_OUTOF10: 0

## 2024-01-23 NOTE — CONSULTS
INTERVAL Hx:  Records and clinical information were reviewed. States she's feeling \"OK\", and that she's not having any SI at all now. Also denies having any PDW thoughts or urges to harm herself, and she has appropriate future-oriented plans (getting back to work, staying with her Mom and family, etc.). Tolerating the Trileptal well so far and I continued to discuss what to expect with that, and the importance of staying on it for at least 2 months. She doesn't want to go to [facility] and she isn't meeting TDO criteria at this point. She prefers to F/U with Dr. Stover for med management, but I let her know she could call/see me at any point if needed. I will send in a script for the Trileptal (#60 with 1 RF) to the local [facility].     MEDS:  Current Facility-Administered Medications   Medication Dose Route Frequency    0.9 % sodium chloride infusion  125 mL/hr IntraVENous Once    sodium chloride flush 0.9 % injection 5-40 mL  5-40 mL IntraVENous 2 times per day    sodium chloride flush 0.9 % injection 5-40 mL  5-40 mL IntraVENous PRN    0.9 % sodium chloride infusion   IntraVENous PRN    ondansetron (ZOFRAN-ODT) disintegrating tablet 4 mg  4 mg Oral Q8H PRN    Or    ondansetron (ZOFRAN) injection 4 mg  4 mg IntraVENous Q6H PRN    polyethylene glycol (GLYCOLAX) packet 17 g  17 g Oral Daily PRN    OXcarbazepine (TRILEPTAL) tablet 300 mg  300 mg Oral BID    potassium chloride (KLOR-CON) extended release tablet 20 mEq  20 mEq Oral Daily       VITALS: Patient Vitals for the past 12 hrs:   Temp Pulse Resp BP SpO2   01/23/24 0830 -- 100 18 136/70 100 %   01/23/24 0802 -- 85 -- -- --   01/23/24 0400 -- 92 -- -- --   01/23/24 0315 98.2 °F (36.8 °C) (!) 102 16 134/72 99 %   01/23/24 0300 -- 89 -- -- --   01/23/24 0100 98.8 °F (37.1 °C) 91 17 136/78 99 %   01/23/24 0000 -- 87 -- -- --   01/22/24 2300 -- 90 -- -- --       LABS: .  Recent Results (from the past 24 hour(s))   Acetaminophen Level    Collection Time: 01/22/24  4:16 PM

## 2024-01-23 NOTE — PROGRESS NOTES
Called Dr Stover's office and spoke with Jaci to make a follow up appointment for the patient. Appointment made for the 29th of January Monday at 1400.   Wound Care: Petrolatum

## 2024-01-23 NOTE — PLAN OF CARE
Problem: Safety - Adult  Goal: Free from fall injury  1/22/2024 2220 by Екатерина Xiao RN  Outcome: Progressing  1/22/2024 1429 by Elizabeth Deleon RN  Outcome: Progressing     Problem: ABCDS Injury Assessment  Goal: Absence of physical injury  1/22/2024 2220 by Екатерина Xiao RN  Outcome: Progressing  1/22/2024 1429 by Elizabeth Deleon RN  Outcome: Progressing     Problem: Self Harm/Suicidality  Goal: Will have no self-injury during hospital stay  Description: INTERVENTIONS:  1.  Ensure constant observer at bedside with Q15M safety checks  2.  Maintain a safe environment  3.  Secure patient belongings  4.  Ensure family/visitors adhere to safety recommendations  5.  Ensure safety tray has been added to patient's diet order  6.  Every shift and PRN: Re-assess suicidal risk via Frequent Screener    1/22/2024 2220 by Екатерина Xiao RN  Outcome: Progressing  1/22/2024 1429 by Elizabeth Deleon RN  Outcome: Progressing     Problem: Pain  Goal: Verbalizes/displays adequate comfort level or baseline comfort level  Outcome: Progressing

## 2024-01-23 NOTE — PROGRESS NOTES
[facility]  Hospitalist Progress Note    NAME: Destiny Cummings   :  1960   MRN:  [id number]     Total duration of encounter: 1 day      Interim Hospital Summary: 63 y.o. female who presented on 2024 with Tylenol overdose, intentional self-harm, initial encounter ([facility]). She has a past medical history of Menopause..    Pt presenting to ED with h/o depression and Tylenol overdose.  Receiving routine tx with ACETADOTE 3 stages.  Some vomiting initially.            Subjective:     Chief Complaint / Reason for Physician Visit  \"fine\".  Discussed with RN   No nausea this evening  Hungry, given supper  Resting    Review of Systems:  Symptom Y/N Comments  Symptom Y/N Comments   Fever/Chills n   Chest Pain n    Poor Appetite n   Edema n    Cough n   Abdominal Pain n    Sputum n   Joint Pain n    SOB/JACKSON n   Pruritis/Rash n    Nausea/vomit y  On adm  Tolerating PT/OT     Diarrhea n   Tolerating Diet y    Constipation n   Other         Current Facility-Administered Medications:     0.9 % sodium chloride infusion, 125 mL/hr, IntraVENous, Once, Jacky Mejia MD    sodium chloride flush 0.9 % injection 5-40 mL, 5-40 mL, IntraVENous, 2 times per day, Jacky Mejia MD    sodium chloride flush 0.9 % injection 5-40 mL, 5-40 mL, IntraVENous, PRN, Jacky Mejia MD    0.9 % sodium chloride infusion, , IntraVENous, PRN, Jacky Mejia MD    ondansetron (ZOFRAN-ODT) disintegrating tablet 4 mg, 4 mg, Oral, Q8H PRN **OR** ondansetron (ZOFRAN) injection 4 mg, 4 mg, IntraVENous, Q6H PRN, Jacky Mejia MD    polyethylene glycol (GLYCOLAX) packet 17 g, 17 g, Oral, Daily PRN, Jacky Mejia MD    acetylcysteine (ACETADOTE) 7,260 mg in dextrose 5 % 1,036.3 mL infusion, 100 mg/kg, IntraVENous, Once, Jacky Mejia MD, Last Rate: 64.8 mL/hr at 24 0725, 7,260 mg at 24 0725    OXcarbazepine (TRILEPTAL) tablet 300 mg, 300 mg, Oral, BID, Schuyler Mckeon MD    potassium chloride (KLOR-CON) extended release tablet 20

## 2024-01-23 NOTE — DISCHARGE SUMMARY
that nobody cares about her.  And she does not want to live by herself.  She was depressed and tried to finish her life and took Tylenol as mentioned above.  She also drank 3 beers yesterday.   After taking Tylenol, she called her friend and her friend then called EMS and was brought to the ER.  On presentation she was feeling nauseous but no vomiting.  No abdominal pain.  No chest pain shortness of breath.  Poison control was contacted and recommended Acetadote drip and patient was admitted to the hospital.     During my examination she is alert, oriented, verbal, communicative, with no agitation or psychosis.  She says that she has history of depression but not taking any medications at home.  And she has not seen any doctor or psychiatrist for depression.  ______________________________________________________________________  DISCHARGE SUMMARY/HOSPITAL COURSE:  for full details see H&P, daily progress notes, labs, consult notes.     Pt presenting to ED with h/o depression and Tylenol overdose. Receiving routine tx with ACETADOTE 3 stages. Some vomiting initially. EtOH was elevated on presentation.     Principal Problem:    Tylenol overdose, intentional self-harm, initial encounter (HCC)  Pt presented with h/o depressed mood and taking Tylenol 650mg - about 15 pills or about 10,000mg  Pt received the ACETADOTE protocol - wt based w/o complicatoin  Serial levels and labs to assess for toxicity were obtained. See Results   F/u testing 1600 good with Tylenol level down <4 with ALT and PT/INR normal range  Per Poison Control repeated lab at 2130 as noted.    F/u BMP, Mg, ALT this AM remained wnl  D/c plan with f/u with PCP Dr. Stover.     Major Dep disorder  Psychiatry Consult and plan/offer for f/u appreciated  Pt wants to see Dr. Stover initially, and consider her desire to see Dr. Mckeon    Glucose Intolerance   01/21/24 20:28 01/22/24 05:09 01/22/24 16:16 01/23/24 05:53   Glucose, Random 123 (H) 169 (H) 157 (H) 103

## 2024-01-23 NOTE — PROGRESS NOTES
Discharge Summary    Destiny Cummings  :  1960  MRN:  [id number]    ADMIT DATE:  2024  DISCHARGE DATE:  2024      Discharge instruction reviewed with Patient    Home med's returned n/a    Personal belongings returned Yes    Patient Wheeled to front entrance via wheelchair with CNA, taken home by friend        SIGNED:    Elizabeth Deleon RN

## 2024-01-23 NOTE — CARE COORDINATION
Transition of Care Plan:    RUR: 7%  Prior Level of Functioning: Independent  Disposition: Home with Family (Mom)  If SNF or IPR: Date FOC offered: N/A  Date FOC received:   Accepting facility:   Date authorization started with reference number:   Date authorization received and expires:   Follow up appointments: Dr. Paty Stover DO [facility] 01/29/2024 2PM  DME needed: No  Transportation at discharge: Friend Odette/POV  IM/IMM Medicare/ letter given: N/A  Is patient a [place] and connected with [facility]?    If yes, was  transfer form completed and VA notified?   Caregiver Contact:   Discharge Caregiver contacted prior to discharge?   Care Conference needed?   Barriers to discharge:  None       01/23/24 1239   Services At/After Discharge   Transition of Care Consult (CM Consult) N/A   Services At/After Discharge None   [place] Resource Information Provided? No   Mode of Transport at Discharge Other (see comment)  (Friend/POV)   Condition of Participation: Discharge Planning   The Patient and/or Patient Representative was provided with a Choice of Provider?   (N/A)     Dr. Mckeon, Psychiatry, has cleared Ms. Cummings to be discharged to home, to stay with mother. Friend Odette will be providing transport. I provided Ms. Cummings with contact information for [facility] at the [facility] in [place]. This program is open Tuesdays and Thursdays and provides activities, socialization, educational information and meals for seniors in the community free of charge. The coordinator there is Shonda. The number is [phone].

## 2024-01-23 NOTE — PLAN OF CARE
Problem: Safety - Adult  Goal: Free from fall injury  Outcome: Progressing     Problem: ABCDS Injury Assessment  Goal: Absence of physical injury  Outcome: Progressing     Problem: Self Harm/Suicidality  Goal: Will have no self-injury during hospital stay  Description: INTERVENTIONS:  1.  Ensure constant observer at bedside with Q15M safety checks  2.  Maintain a safe environment  3.  Secure patient belongings  4.  Ensure family/visitors adhere to safety recommendations  5.  Ensure safety tray has been added to patient's diet order  6.  Every shift and PRN: Re-assess suicidal risk via Frequent Screener    Outcome: Progressing  Flowsheets (Taken 1/23/2024 1325 by Anna Garnett, RN)  Will have no self-injury during hospital stay: Maintain a safe environment     Problem: Pain  Goal: Verbalizes/displays adequate comfort level or baseline comfort level  Outcome: Progressing

## 2024-01-23 NOTE — DISCHARGE INSTRUCTIONS
Hospitalist Recommendations    Dr. Mckeon suggested staying with someone for a few days - Mom, Friends  Take new medication Trileptal as prescribed - may  at [facility]  Other medications would be optional for the next week  Pt plans to take her aspirin daily   is willing to give her follow up at [facility] if she is acceptant  JADA MURILLO MD

## 2024-01-24 LAB
EKG ATRIAL RATE: 79 BPM
EKG DIAGNOSIS: NORMAL
EKG P AXIS: 57 DEGREES
EKG P-R INTERVAL: 168 MS
EKG Q-T INTERVAL: 388 MS
EKG QRS DURATION: 104 MS
EKG QTC CALCULATION (BAZETT): 444 MS
EKG R AXIS: 59 DEGREES
EKG T AXIS: 57 DEGREES
EKG VENTRICULAR RATE: 79 BPM

## 2024-01-28 LAB
ALBUMIN SERPL-MCNC: 3.4 G/DL (ref 3.5–5)
ALBUMIN/GLOB SERPL: 0.9 (ref 1.1–2.2)
ALP SERPL-CCNC: 63 U/L (ref 45–117)
ALT SERPL-CCNC: 32 U/L (ref 12–78)
ANION GAP SERPL CALC-SCNC: 14 MMOL/L (ref 5–15)
AST SERPL-CCNC: 26 U/L (ref 15–37)
BILIRUB SERPL-MCNC: 0.3 MG/DL (ref 0.2–1)
BUN SERPL-MCNC: 11 MG/DL (ref 6–20)
BUN/CREAT SERPL: 15 (ref 12–20)
CALCIUM SERPL-MCNC: 8.6 MG/DL (ref 8.5–10.1)
CHLORIDE SERPL-SCNC: 100 MMOL/L (ref 97–108)
CO2 SERPL-SCNC: 25 MMOL/L (ref 21–32)
CREAT SERPL-MCNC: 0.74 MG/DL (ref 0.55–1.02)
GLOBULIN SER CALC-MCNC: 3.8 G/DL (ref 2–4)
GLUCOSE SERPL-MCNC: 169 MG/DL (ref 65–100)
POTASSIUM SERPL-SCNC: 3.3 MMOL/L (ref 3.5–5.1)
PROT SERPL-MCNC: 7.2 G/DL (ref 6.4–8.2)
SODIUM SERPL-SCNC: 139 MMOL/L (ref 136–145)

## 2024-06-12 ENCOUNTER — APPOINTMENT (OUTPATIENT)
Facility: HOSPITAL | Age: 64
End: 2024-06-12
Payer: COMMERCIAL

## 2024-06-12 ENCOUNTER — HOSPITAL ENCOUNTER (EMERGENCY)
Facility: HOSPITAL | Age: 64
Discharge: HOME | End: 2024-06-12
Attending: EMERGENCY MEDICINE
Payer: COMMERCIAL

## 2024-06-12 VITALS
SYSTOLIC BLOOD PRESSURE: 132 MMHG | DIASTOLIC BLOOD PRESSURE: 84 MMHG | RESPIRATION RATE: 15 BRPM | HEART RATE: 88 BPM | OXYGEN SATURATION: 100 % | TEMPERATURE: 98.2 F

## 2024-06-12 DIAGNOSIS — R93.5 ABNORMAL COMPUTED TOMOGRAPHY OF ABDOMEN AND PELVIS: ICD-10-CM

## 2024-06-12 DIAGNOSIS — R10.84 GENERALIZED ABDOMINAL PAIN: Primary | ICD-10-CM

## 2024-06-12 DIAGNOSIS — R11.2 NAUSEA AND VOMITING, UNSPECIFIED VOMITING TYPE: ICD-10-CM

## 2024-06-12 LAB
ALBUMIN SERPL-MCNC: 4 G/DL (ref 3.5–5)
ALBUMIN/GLOB SERPL: 1 (ref 1.1–2.2)
ALP SERPL-CCNC: 80 U/L (ref 45–117)
ALT SERPL-CCNC: 49 U/L (ref 12–78)
ANION GAP SERPL CALC-SCNC: 8 MMOL/L (ref 5–15)
APPEARANCE UR: CLEAR
AST SERPL-CCNC: 59 U/L (ref 15–37)
BACTERIA URNS QL MICRO: NEGATIVE /HPF
BASOPHILS # BLD: 0 K/UL (ref 0–0.1)
BASOPHILS NFR BLD: 0 % (ref 0–1)
BILIRUB SERPL-MCNC: 0.7 MG/DL (ref 0.2–1)
BILIRUB UR QL: NEGATIVE
BUN SERPL-MCNC: 11 MG/DL (ref 6–20)
BUN/CREAT SERPL: 11 (ref 12–20)
CALCIUM SERPL-MCNC: 9.3 MG/DL (ref 8.5–10.1)
CHLORIDE SERPL-SCNC: 102 MMOL/L (ref 97–108)
CO2 SERPL-SCNC: 29 MMOL/L (ref 21–32)
COLOR UR: ABNORMAL
CREAT SERPL-MCNC: 1.01 MG/DL (ref 0.55–1.02)
DIFFERENTIAL METHOD BLD: ABNORMAL
EOSINOPHIL # BLD: 0 K/UL (ref 0–0.4)
EOSINOPHIL NFR BLD: 1 % (ref 0–7)
EPITH CASTS URNS QL MICRO: ABNORMAL /LPF
ERYTHROCYTE [DISTWIDTH] IN BLOOD BY AUTOMATED COUNT: 13.7 % (ref 11.5–14.5)
GLOBULIN SER CALC-MCNC: 4.1 G/DL (ref 2–4)
GLUCOSE BLD STRIP.AUTO-MCNC: 196 MG/DL (ref 65–117)
GLUCOSE SERPL-MCNC: 183 MG/DL (ref 65–100)
GLUCOSE UR STRIP.AUTO-MCNC: NEGATIVE MG/DL
HCT VFR BLD AUTO: 35.1 % (ref 35–47)
HGB BLD-MCNC: 12 G/DL (ref 11.5–16)
HGB UR QL STRIP: ABNORMAL
IMM GRANULOCYTES # BLD AUTO: 0 K/UL (ref 0–0.04)
IMM GRANULOCYTES NFR BLD AUTO: 0 % (ref 0–0.5)
KETONES UR QL STRIP.AUTO: ABNORMAL MG/DL
LEUKOCYTE ESTERASE UR QL STRIP.AUTO: NEGATIVE
LIPASE SERPL-CCNC: 47 U/L (ref 13–75)
LYMPHOCYTES # BLD: 2.5 K/UL (ref 0.8–3.5)
LYMPHOCYTES NFR BLD: 29 % (ref 12–49)
MCH RBC QN AUTO: 33.3 PG (ref 26–34)
MCHC RBC AUTO-ENTMCNC: 34.2 G/DL (ref 30–36.5)
MCV RBC AUTO: 97.5 FL (ref 80–99)
MONOCYTES # BLD: 0.7 K/UL (ref 0–1)
MONOCYTES NFR BLD: 8 % (ref 5–13)
NEUTS SEG # BLD: 5.4 K/UL (ref 1.8–8)
NEUTS SEG NFR BLD: 62 % (ref 32–75)
NITRITE UR QL STRIP.AUTO: NEGATIVE
NRBC # BLD: 0 K/UL (ref 0–0.01)
NRBC BLD-RTO: 0 PER 100 WBC
PH UR STRIP: 6 (ref 5–8)
PLATELET # BLD AUTO: 253 K/UL (ref 150–400)
PMV BLD AUTO: 9.7 FL (ref 8.9–12.9)
POTASSIUM SERPL-SCNC: 3.8 MMOL/L (ref 3.5–5.1)
PROT SERPL-MCNC: 8.1 G/DL (ref 6.4–8.2)
PROT UR STRIP-MCNC: NEGATIVE MG/DL
RBC # BLD AUTO: 3.6 M/UL (ref 3.8–5.2)
RBC #/AREA URNS HPF: ABNORMAL /HPF (ref 0–5)
SERVICE CMNT-IMP: ABNORMAL
SODIUM SERPL-SCNC: 139 MMOL/L (ref 136–145)
SP GR UR REFRACTOMETRY: 1.01 (ref 1–1.03)
URINE CULTURE IF INDICATED: ABNORMAL
UROBILINOGEN UR QL STRIP.AUTO: 0.2 EU/DL (ref 0.2–1)
WBC # BLD AUTO: 8.7 K/UL (ref 3.6–11)
WBC URNS QL MICRO: ABNORMAL /HPF (ref 0–4)

## 2024-06-12 PROCEDURE — 96374 THER/PROPH/DIAG INJ IV PUSH: CPT

## 2024-06-12 PROCEDURE — 2500000003 HC RX 250 WO HCPCS: Performed by: EMERGENCY MEDICINE

## 2024-06-12 PROCEDURE — 99285 EMERGENCY DEPT VISIT HI MDM: CPT

## 2024-06-12 PROCEDURE — 6360000002 HC RX W HCPCS: Performed by: EMERGENCY MEDICINE

## 2024-06-12 PROCEDURE — 2580000003 HC RX 258: Performed by: EMERGENCY MEDICINE

## 2024-06-12 PROCEDURE — 74177 CT ABD & PELVIS W/CONTRAST: CPT

## 2024-06-12 PROCEDURE — 36415 COLL VENOUS BLD VENIPUNCTURE: CPT

## 2024-06-12 PROCEDURE — 83690 ASSAY OF LIPASE: CPT

## 2024-06-12 PROCEDURE — 81001 URINALYSIS AUTO W/SCOPE: CPT

## 2024-06-12 PROCEDURE — 96375 TX/PRO/DX INJ NEW DRUG ADDON: CPT

## 2024-06-12 PROCEDURE — 82962 GLUCOSE BLOOD TEST: CPT

## 2024-06-12 PROCEDURE — 80053 COMPREHEN METABOLIC PANEL: CPT

## 2024-06-12 PROCEDURE — 85025 COMPLETE CBC W/AUTO DIFF WBC: CPT

## 2024-06-12 PROCEDURE — 6360000004 HC RX CONTRAST MEDICATION: Performed by: EMERGENCY MEDICINE

## 2024-06-12 RX ORDER — 0.9 % SODIUM CHLORIDE 0.9 %
1000 INTRAVENOUS SOLUTION INTRAVENOUS ONCE
Status: COMPLETED | OUTPATIENT
Start: 2024-06-12 | End: 2024-06-12

## 2024-06-12 RX ORDER — DICYCLOMINE HCL 20 MG
20 TABLET ORAL 3 TIMES DAILY PRN
Qty: 20 TABLET | Refills: 0 | Status: SHIPPED | OUTPATIENT
Start: 2024-06-12

## 2024-06-12 RX ORDER — PANTOPRAZOLE SODIUM 40 MG/1
40 TABLET, DELAYED RELEASE ORAL
Qty: 30 TABLET | Refills: 0 | Status: SHIPPED | OUTPATIENT
Start: 2024-06-12

## 2024-06-12 RX ORDER — ONDANSETRON 2 MG/ML
4 INJECTION INTRAMUSCULAR; INTRAVENOUS
Status: COMPLETED | OUTPATIENT
Start: 2024-06-12 | End: 2024-06-12

## 2024-06-12 RX ORDER — ONDANSETRON 4 MG/1
4 TABLET, ORALLY DISINTEGRATING ORAL 3 TIMES DAILY PRN
Qty: 21 TABLET | Refills: 0 | Status: SHIPPED | OUTPATIENT
Start: 2024-06-12

## 2024-06-12 RX ADMIN — IOPAMIDOL 100 ML: 612 INJECTION, SOLUTION INTRAVENOUS at 17:18

## 2024-06-12 RX ADMIN — FAMOTIDINE 20 MG: 10 INJECTION, SOLUTION INTRAVENOUS at 17:30

## 2024-06-12 RX ADMIN — ONDANSETRON 4 MG: 2 INJECTION INTRAMUSCULAR; INTRAVENOUS at 17:30

## 2024-06-12 RX ADMIN — SODIUM CHLORIDE 1000 ML: 9 INJECTION, SOLUTION INTRAVENOUS at 17:34

## 2024-06-12 ASSESSMENT — LIFESTYLE VARIABLES
HOW MANY STANDARD DRINKS CONTAINING ALCOHOL DO YOU HAVE ON A TYPICAL DAY: 3 OR 4
HOW OFTEN DO YOU HAVE A DRINK CONTAINING ALCOHOL: 4 OR MORE TIMES A WEEK

## 2024-06-12 ASSESSMENT — PAIN - FUNCTIONAL ASSESSMENT
PAIN_FUNCTIONAL_ASSESSMENT: NONE - DENIES PAIN
PAIN_FUNCTIONAL_ASSESSMENT: NONE - DENIES PAIN

## 2024-06-13 NOTE — ED PROVIDER NOTES
parts of this dictation were completed with voice recognition software. Quite often unanticipated grammatical, syntax, homophones, and other interpretive errors are inadvertently transcribed by the computer software. Please disregards these errors. Please excuse any errors that have escaped final proofreading.)         Dominique Miranda MD  06/16/24 1952

## 2024-06-13 NOTE — DISCHARGE INSTRUCTIONS
Your abdominal pelvis CT showed enlargement of your ovaries.  It is very important that you follow-up with your primary care doctor or OB/GYN so they can set you up for an outpatient pelvic ultrasound to better evaluate this finding and make sure there is not a mass/cancer.

## 2024-11-01 ENCOUNTER — HOSPITAL ENCOUNTER (OUTPATIENT)
Facility: HOSPITAL | Age: 64
Discharge: HOME | End: 2024-11-04
Payer: COMMERCIAL

## 2024-11-01 DIAGNOSIS — Z12.31 VISIT FOR SCREENING MAMMOGRAM: ICD-10-CM

## 2024-11-01 PROCEDURE — 77063 BREAST TOMOSYNTHESIS BI: CPT

## 2024-11-22 ENCOUNTER — HOSPITAL ENCOUNTER (EMERGENCY)
Facility: HOSPITAL | Age: 64
Discharge: HOME | End: 2024-11-22
Attending: FAMILY MEDICINE | Admitting: FAMILY MEDICINE
Payer: COMMERCIAL

## 2024-11-22 ENCOUNTER — APPOINTMENT (OUTPATIENT)
Facility: HOSPITAL | Age: 64
End: 2024-11-22
Payer: COMMERCIAL

## 2024-11-22 VITALS
OXYGEN SATURATION: 97 % | HEART RATE: 104 BPM | DIASTOLIC BLOOD PRESSURE: 68 MMHG | RESPIRATION RATE: 20 BRPM | SYSTOLIC BLOOD PRESSURE: 137 MMHG | TEMPERATURE: 98.7 F

## 2024-11-22 DIAGNOSIS — U07.1 COVID: Primary | ICD-10-CM

## 2024-11-22 DIAGNOSIS — R51.9 ACUTE NONINTRACTABLE HEADACHE, UNSPECIFIED HEADACHE TYPE: ICD-10-CM

## 2024-11-22 DIAGNOSIS — R00.0 TACHYCARDIA: ICD-10-CM

## 2024-11-22 LAB
ALBUMIN SERPL-MCNC: 4 G/DL (ref 3.5–5)
ALBUMIN/GLOB SERPL: 1 (ref 1.1–2.2)
ALP SERPL-CCNC: 82 U/L (ref 45–117)
ALT SERPL-CCNC: 28 U/L (ref 12–78)
ANION GAP SERPL CALC-SCNC: 8 MMOL/L (ref 2–12)
AST SERPL-CCNC: 32 U/L (ref 15–37)
BASOPHILS # BLD: 0 K/UL (ref 0–0.1)
BASOPHILS NFR BLD: 0 % (ref 0–1)
BILIRUB SERPL-MCNC: 0.4 MG/DL (ref 0.2–1)
BUN SERPL-MCNC: 7 MG/DL (ref 6–20)
BUN/CREAT SERPL: 9 (ref 12–20)
CALCIUM SERPL-MCNC: 9.1 MG/DL (ref 8.5–10.1)
CHLORIDE SERPL-SCNC: 104 MMOL/L (ref 97–108)
CO2 SERPL-SCNC: 29 MMOL/L (ref 21–32)
CREAT SERPL-MCNC: 0.82 MG/DL (ref 0.55–1.02)
DIFFERENTIAL METHOD BLD: ABNORMAL
EOSINOPHIL # BLD: 0.1 K/UL (ref 0–0.4)
EOSINOPHIL NFR BLD: 1 % (ref 0–7)
ERYTHROCYTE [DISTWIDTH] IN BLOOD BY AUTOMATED COUNT: 13 % (ref 11.5–14.5)
GLOBULIN SER CALC-MCNC: 4.2 G/DL (ref 2–4)
GLUCOSE SERPL-MCNC: 111 MG/DL (ref 65–100)
HCT VFR BLD AUTO: 36.4 % (ref 35–47)
HGB BLD-MCNC: 12.3 G/DL (ref 11.5–16)
IMM GRANULOCYTES # BLD AUTO: 0 K/UL (ref 0–0.04)
IMM GRANULOCYTES NFR BLD AUTO: 0 % (ref 0–0.5)
LYMPHOCYTES # BLD: 0.6 K/UL (ref 0.8–3.5)
LYMPHOCYTES NFR BLD: 9 % (ref 12–49)
MCH RBC QN AUTO: 32.9 PG (ref 26–34)
MCHC RBC AUTO-ENTMCNC: 33.8 G/DL (ref 30–36.5)
MCV RBC AUTO: 97.3 FL (ref 80–99)
MONOCYTES # BLD: 0.6 K/UL (ref 0–1)
MONOCYTES NFR BLD: 9 % (ref 5–13)
NEUTS SEG # BLD: 5.7 K/UL (ref 1.8–8)
NEUTS SEG NFR BLD: 81 % (ref 32–75)
NRBC # BLD: 0 K/UL (ref 0–0.01)
NRBC BLD-RTO: 0 PER 100 WBC
PLATELET # BLD AUTO: 208 K/UL (ref 150–400)
PMV BLD AUTO: 9.8 FL (ref 8.9–12.9)
POTASSIUM SERPL-SCNC: 4.1 MMOL/L (ref 3.5–5.1)
PROT SERPL-MCNC: 8.2 G/DL (ref 6.4–8.2)
RBC # BLD AUTO: 3.74 M/UL (ref 3.8–5.2)
RBC MORPH BLD: ABNORMAL
RBC MORPH BLD: ABNORMAL
SODIUM SERPL-SCNC: 141 MMOL/L (ref 136–145)
WBC # BLD AUTO: 7 K/UL (ref 3.6–11)

## 2024-11-22 PROCEDURE — 80053 COMPREHEN METABOLIC PANEL: CPT

## 2024-11-22 PROCEDURE — 71045 X-RAY EXAM CHEST 1 VIEW: CPT

## 2024-11-22 PROCEDURE — 2580000003 HC RX 258: Performed by: FAMILY MEDICINE

## 2024-11-22 PROCEDURE — 36415 COLL VENOUS BLD VENIPUNCTURE: CPT

## 2024-11-22 PROCEDURE — 6370000000 HC RX 637 (ALT 250 FOR IP): Performed by: FAMILY MEDICINE

## 2024-11-22 PROCEDURE — 96360 HYDRATION IV INFUSION INIT: CPT

## 2024-11-22 PROCEDURE — 85025 COMPLETE CBC W/AUTO DIFF WBC: CPT

## 2024-11-22 PROCEDURE — 99284 EMERGENCY DEPT VISIT MOD MDM: CPT

## 2024-11-22 RX ORDER — BENZONATATE 100 MG/1
100 CAPSULE ORAL
Status: COMPLETED | OUTPATIENT
Start: 2024-11-22 | End: 2024-11-22

## 2024-11-22 RX ORDER — IBUPROFEN 600 MG/1
600 TABLET, FILM COATED ORAL
Status: COMPLETED | OUTPATIENT
Start: 2024-11-22 | End: 2024-11-22

## 2024-11-22 RX ORDER — 0.9 % SODIUM CHLORIDE 0.9 %
1000 INTRAVENOUS SOLUTION INTRAVENOUS ONCE
Status: COMPLETED | OUTPATIENT
Start: 2024-11-22 | End: 2024-11-22

## 2024-11-22 RX ORDER — BENZONATATE 100 MG/1
100 CAPSULE ORAL 3 TIMES DAILY PRN
Qty: 30 CAPSULE | Refills: 0 | Status: SHIPPED | OUTPATIENT
Start: 2024-11-22 | End: 2024-12-02

## 2024-11-22 RX ORDER — ACETAMINOPHEN 500 MG
1000 TABLET ORAL
Status: COMPLETED | OUTPATIENT
Start: 2024-11-22 | End: 2024-11-22

## 2024-11-22 RX ADMIN — ACETAMINOPHEN 1000 MG: 500 TABLET ORAL at 04:51

## 2024-11-22 RX ADMIN — IBUPROFEN 600 MG: 600 TABLET, FILM COATED ORAL at 05:13

## 2024-11-22 RX ADMIN — SODIUM CHLORIDE 1000 ML: 9 INJECTION, SOLUTION INTRAVENOUS at 05:12

## 2024-11-22 RX ADMIN — BENZONATATE 100 MG: 100 CAPSULE ORAL at 04:51

## 2024-11-22 ASSESSMENT — PAIN - FUNCTIONAL ASSESSMENT: PAIN_FUNCTIONAL_ASSESSMENT: 0-10

## 2024-11-22 ASSESSMENT — PAIN SCALES - GENERAL
PAINLEVEL_OUTOF10: 5

## 2024-11-22 ASSESSMENT — PAIN DESCRIPTION - LOCATION: LOCATION: HEAD

## 2024-11-22 ASSESSMENT — PAIN DESCRIPTION - DESCRIPTORS: DESCRIPTORS: ACHING

## 2024-11-22 NOTE — ED PROVIDER NOTES
Take 1 tablet by mouth daily    LORATADINE (CLARITIN) 10 MG TABLET    Take 1 tablet by mouth daily    ONDANSETRON (ZOFRAN-ODT) 4 MG DISINTEGRATING TABLET    Take 1 tablet by mouth 3 times daily as needed for Nausea or Vomiting    OXCARBAZEPINE (TRILEPTAL) 300 MG TABLET    Take 1 tablet by mouth 2 times daily    PANTOPRAZOLE (PROTONIX) 40 MG TABLET    Take 1 tablet by mouth every morning (before breakfast)    ROSUVASTATIN (CRESTOR) 20 MG TABLET    rosuvastatin 20 mg tablet   Take 1 tablet every day by oral route.       SCREENINGS               No data recorded        PHYSICAL EXAM      ED Triage Vitals [11/22/24 8283]   Encounter Vitals Group      BP (!) 155/81      Systolic BP Percentile       Diastolic BP Percentile       Pulse (!) 110      Respirations 20      Temp 98.7 °F (37.1 °C)      Temp Source Oral      SpO2 97 %      Weight       Height       Head Circumference       Peak Flow       Pain Score       Pain Loc       Pain Education       Exclude from Growth Chart          Physical Exam  Vitals reviewed.   Constitutional:       Appearance: Normal appearance.   HENT:      Head: Normocephalic and atraumatic.      Right Ear: Tympanic membrane and external ear normal.      Left Ear: Tympanic membrane and external ear normal.      Nose: Congestion and rhinorrhea present.      Mouth/Throat:      Mouth: Mucous membranes are dry.   Eyes:      Extraocular Movements: Extraocular movements intact.      Pupils: Pupils are equal, round, and reactive to light.   Cardiovascular:      Rate and Rhythm: Regular rhythm. Tachycardia present.      Heart sounds: Normal heart sounds. No murmur heard.  Pulmonary:      Effort: Pulmonary effort is normal.      Breath sounds: Normal breath sounds. No wheezing.   Abdominal:      General: Abdomen is flat.      Palpations: Abdomen is soft.      Tenderness: There is no abdominal tenderness. There is no guarding.   Musculoskeletal:         General: No swelling, deformity or signs of injury.

## 2024-11-22 NOTE — DISCHARGE INSTRUCTIONS
--Try to drink plenty of fluids over the next few days.  --Robitussin DM 10 ml every 4 hours as needed for cough.  --Prescription for benzonatate 100 mg every 8 hours as needed for cough.  --Tylenol 1000 mg every 6 hours as  needed for headache. OK to take ibuprofen 600 mg every 6 hours as needed for pain, but take with food.  --follow up with Dr. Stover by phone next week if you have further questions.

## 2024-11-22 NOTE — ED TRIAGE NOTES
Pt arrived via EMS with complaints of a cough and headache that started last night s/p being diagnosed yesterday in this emergency room with Covid 19. Pt is A&Ox4 and shows no signs of respiratory distress. Pt was tachycardic upon assessment

## 2025-06-15 ENCOUNTER — APPOINTMENT (OUTPATIENT)
Facility: HOSPITAL | Age: 65
End: 2025-06-15

## 2025-06-15 ENCOUNTER — HOSPITAL ENCOUNTER (EMERGENCY)
Facility: HOSPITAL | Age: 65
Discharge: HOME | End: 2025-06-15
Attending: EMERGENCY MEDICINE

## 2025-06-15 VITALS
DIASTOLIC BLOOD PRESSURE: 76 MMHG | WEIGHT: 150 LBS | RESPIRATION RATE: 16 BRPM | HEART RATE: 88 BPM | SYSTOLIC BLOOD PRESSURE: 132 MMHG | BODY MASS INDEX: 27.6 KG/M2 | HEIGHT: 62 IN | TEMPERATURE: 98.3 F | OXYGEN SATURATION: 96 %

## 2025-06-15 DIAGNOSIS — R74.8 ELEVATED LIPASE: ICD-10-CM

## 2025-06-15 DIAGNOSIS — R55 SYNCOPE AND COLLAPSE: Primary | ICD-10-CM

## 2025-06-15 DIAGNOSIS — F10.929 ALCOHOLIC INTOXICATION WITH COMPLICATION: ICD-10-CM

## 2025-06-15 LAB
ALBUMIN SERPL-MCNC: 4 G/DL (ref 3.5–5)
ALBUMIN/GLOB SERPL: 1 (ref 1.1–2.2)
ALP SERPL-CCNC: 88 U/L (ref 45–117)
ALT SERPL-CCNC: 26 U/L (ref 12–78)
ANION GAP SERPL CALC-SCNC: 14 MMOL/L (ref 2–12)
AST SERPL-CCNC: 28 U/L (ref 15–37)
BASOPHILS # BLD: 0.02 K/UL (ref 0–0.1)
BASOPHILS NFR BLD: 0.3 % (ref 0–1)
BILIRUB SERPL-MCNC: 0.2 MG/DL (ref 0.2–1)
BUN SERPL-MCNC: 13 MG/DL (ref 6–20)
BUN/CREAT SERPL: 15 (ref 12–20)
CALCIUM SERPL-MCNC: 9.2 MG/DL (ref 8.5–10.1)
CHLORIDE SERPL-SCNC: 109 MMOL/L (ref 97–108)
CO2 SERPL-SCNC: 25 MMOL/L (ref 21–32)
CREAT SERPL-MCNC: 0.84 MG/DL (ref 0.55–1.02)
DIFFERENTIAL METHOD BLD: ABNORMAL
EOSINOPHIL # BLD: 0.08 K/UL (ref 0–0.4)
EOSINOPHIL NFR BLD: 1 % (ref 0–0.7)
ERYTHROCYTE [DISTWIDTH] IN BLOOD BY AUTOMATED COUNT: 12.4 % (ref 11.5–14.5)
ETHANOL SERPL-MCNC: 320 MG/DL (ref 0–0.08)
GLOBULIN SER CALC-MCNC: 4.1 G/DL (ref 2–4)
GLUCOSE SERPL-MCNC: 113 MG/DL (ref 65–100)
HCT VFR BLD AUTO: 38.6 % (ref 35–47)
HGB BLD-MCNC: 13 G/DL (ref 11.5–16)
IMM GRANULOCYTES # BLD AUTO: 0.04 K/UL (ref 0–0.04)
IMM GRANULOCYTES NFR BLD AUTO: 0.5 % (ref 0–0.5)
LIPASE SERPL-CCNC: 160 U/L (ref 13–75)
LYMPHOCYTES # BLD: 4.55 K/UL (ref 0.8–3.5)
LYMPHOCYTES NFR BLD: 58.3 % (ref 12–49)
MCH RBC QN AUTO: 32.7 PG (ref 26–34)
MCHC RBC AUTO-ENTMCNC: 33.7 G/DL (ref 30–36.5)
MCV RBC AUTO: 97.2 FL (ref 80–99)
MONOCYTES # BLD: 0.56 K/UL (ref 0–1)
MONOCYTES NFR BLD: 7.2 % (ref 5–13)
NEUTS SEG # BLD: 2.56 K/UL (ref 1.8–8)
NEUTS SEG NFR BLD: 32.7 % (ref 32–75)
NRBC # BLD: 0 K/UL (ref 0–0.01)
NRBC BLD-RTO: 0 PER 100 WBC
PLATELET # BLD AUTO: 296 K/UL (ref 150–400)
PMV BLD AUTO: 9.6 FL (ref 8.9–12.9)
POTASSIUM SERPL-SCNC: 4 MMOL/L (ref 3.5–5.1)
PROT SERPL-MCNC: 8.1 G/DL (ref 6.4–8.2)
RBC # BLD AUTO: 3.97 M/UL (ref 3.8–5.2)
SODIUM SERPL-SCNC: 148 MMOL/L (ref 136–145)
TROPONIN I SERPL HS-MCNC: <4 NG/L (ref 0–51)
WBC # BLD AUTO: 7.8 K/UL (ref 3.6–11)

## 2025-06-15 PROCEDURE — 70450 CT HEAD/BRAIN W/O DYE: CPT

## 2025-06-15 PROCEDURE — 85025 COMPLETE CBC W/AUTO DIFF WBC: CPT

## 2025-06-15 PROCEDURE — 83690 ASSAY OF LIPASE: CPT

## 2025-06-15 PROCEDURE — 82077 ASSAY SPEC XCP UR&BREATH IA: CPT

## 2025-06-15 PROCEDURE — 93005 ELECTROCARDIOGRAM TRACING: CPT | Performed by: EMERGENCY MEDICINE

## 2025-06-15 PROCEDURE — 80053 COMPREHEN METABOLIC PANEL: CPT

## 2025-06-15 PROCEDURE — 36415 COLL VENOUS BLD VENIPUNCTURE: CPT

## 2025-06-15 PROCEDURE — 84484 ASSAY OF TROPONIN QUANT: CPT

## 2025-06-15 PROCEDURE — 99284 EMERGENCY DEPT VISIT MOD MDM: CPT

## 2025-06-15 ASSESSMENT — PAIN - FUNCTIONAL ASSESSMENT
PAIN_FUNCTIONAL_ASSESSMENT: 0-10
PAIN_FUNCTIONAL_ASSESSMENT: 0-10

## 2025-06-15 ASSESSMENT — PAIN SCALES - GENERAL
PAINLEVEL_OUTOF10: 0
PAINLEVEL_OUTOF10: 0

## 2025-06-15 NOTE — ED NOTES
Patient requesting something to eat and needing redirection back into room and to stay in bed. Patient given a meal tray and educated on staying in bed and using call bell if she needed anything.

## 2025-06-15 NOTE — ED PROVIDER NOTES
[facility] EMERGENCY DEPARTMENT  EMERGENCY DEPARTMENT ENCOUNTER       Pt Name: Destiny Cummings  MRN: [id number]  Birthdate 1960  Date of evaluation: 6/15/2025  Provider: Jamil Raines DO   PCP: Paty Stover DO  Note Started: 6:19 PM EDT 6/15/25     CHIEF COMPLAINT       Chief Complaint   Patient presents with    Loss of Consciousness        HISTORY OF PRESENT ILLNESS: 1 or more elements      History From: Patient, History limited by: none     Destiny Cummings is a 64 y.o. female presenting the emergency department brought in by EMS for syncopal episode.  Patient reports that she has been having a headache for the last 3 days.  She reports that she was having some beers with friends when she had a syncopal episode.  Denies any prodromal symptoms.  Nothing makes her symptoms better or worse.       Please See MDM for Additional Details of the HPI/PMH  Nursing Notes were all reviewed and agreed with or any disagreements were addressed in the HPI.     REVIEW OF SYSTEMS        Positives and Pertinent negatives as per HPI.    PAST HISTORY     Past Medical History:  Past Medical History:   Diagnosis Date    Allergic rhinitis due to pollen     GERD with esophagitis     Hypercholesterolemia     Menopause     Restless leg syndrome        Past Surgical History:  Past Surgical History:   Procedure Laterality Date    BREAST BIOPSY Left     BENIGN EARLY 2000s       Family History:  Family History   Problem Relation Age of Onset    Breast Cancer Sister 39       Social History:  Social History     Tobacco Use    Smoking status: Never    Smokeless tobacco: Never   Substance Use Topics    Alcohol use: Yes       Allergies:  Allergies   Allergen Reactions    Apple Juice Nausea And Vomiting    Penicillins Itching, Other (See Comments) and Rash     Rash and itching        CURRENT MEDICATIONS      Discharge Medication List as of 6/15/2025  7:48 PM        CONTINUE these medications which have NOT CHANGED    Details

## 2025-06-15 NOTE — ED TRIAGE NOTES
Syncopal moment while sitting in the chair and en route per EMS . Arrived awake, alert and oriented with + ETOH smell. NIHSS 1 ( speech)

## 2025-06-16 LAB
EKG ATRIAL RATE: 93 BPM
EKG DIAGNOSIS: NORMAL
EKG P AXIS: 48 DEGREES
EKG P-R INTERVAL: 154 MS
EKG Q-T INTERVAL: 366 MS
EKG QRS DURATION: 100 MS
EKG QTC CALCULATION (BAZETT): 455 MS
EKG R AXIS: 27 DEGREES
EKG T AXIS: 51 DEGREES
EKG VENTRICULAR RATE: 93 BPM

## 2025-06-16 NOTE — ED NOTES
I have reviewed discharge instructions with the patient and family. The patient and family verbalized understanding. Discharge medications discussed with patient. No questions at this time. Ambulated without difficulty.

## (undated) DEVICE — BLANKET WRM AD PREM MISTRAL-AIR

## (undated) DEVICE — DRAPE SHT 3 QTR PROXIMA 53X77 --

## (undated) DEVICE — CANISTER KIT W TANDEM TUBES -- AQUILEX 36/CANISTERS/KT

## (undated) DEVICE — SYR 10ML CTRL LR LCK NSAF LF --

## (undated) DEVICE — GARMENT,MEDLINE,DVT,INT,THIGH,M, GEN2: Brand: MEDLINE

## (undated) DEVICE — DEVICE TISS REM IU CANSTR VAC TB FT PEDAL DISPOSABLE MYOSURE

## (undated) DEVICE — SOLUTION IRRIG 3000ML 0.9% SOD CHL ARTHROMATIC PLAS CONT

## (undated) DEVICE — SET SEALS HYSTEROSCOPE DISP -- MYOSURE  EA=10

## (undated) DEVICE — PAD,NON-ADHERENT,2X3,STERILE,LF,1/PK: Brand: MEDLINE

## (undated) DEVICE — Z DISCONTINUED BY MEDLINE USE 2733855 TRAY SKIN SCRB VAG PVP-I

## (undated) DEVICE — PAD,SANITARY,11 IN,MAXI,N-STRL,IND WRAP: Brand: MEDLINE

## (undated) DEVICE — X-RAY DETECTABLE SPONGES,16 PLY: Brand: VISTEC

## (undated) DEVICE — NEEDLE HYPO 25GA L1.5IN BLU POLYPR HUB S STL REG BVL STR

## (undated) DEVICE — LABEL STERILIZATION W/PEN -- 50/CA

## (undated) DEVICE — GLOVE ORANGE PI 8   MSG9080

## (undated) DEVICE — TUBE ST FLD AQUILEX OUTFLO --

## (undated) DEVICE — INFECTION CONTROL KIT SYS

## (undated) DEVICE — GOWN,REINFORCED,POLY,AURORA,XLARGE,STRL: Brand: MEDLINE

## (undated) DEVICE — CONVERTORS UNDER BUTTOCKS DRAPE WITH FLUID CONTROL POUCH II: Brand: CONVERTORS

## (undated) DEVICE — NEEDLE HYPO 18GA L1.5IN PNK POLYPR HUB S STL THN WALL FILL

## (undated) DEVICE — GLOVE ORANGE PI 7 1/2   MSG9075

## (undated) DEVICE — PERI/GYN PACK: Brand: CONVERTORS

## (undated) DEVICE — TUBE ST FLD CTRL AQUILEX INFLO --

## (undated) DEVICE — LEGGINGS, PAIR, 31X48, STERILE: Brand: MEDLINE